# Patient Record
Sex: MALE | Race: WHITE | NOT HISPANIC OR LATINO | Employment: OTHER | URBAN - METROPOLITAN AREA
[De-identification: names, ages, dates, MRNs, and addresses within clinical notes are randomized per-mention and may not be internally consistent; named-entity substitution may affect disease eponyms.]

---

## 2017-01-01 ENCOUNTER — APPOINTMENT (OUTPATIENT)
Dept: CT IMAGING | Facility: HOSPITAL | Age: 82
End: 2017-01-01

## 2017-01-01 ENCOUNTER — INPATIENT HOSPITAL (OUTPATIENT)
Dept: URBAN - METROPOLITAN AREA HOSPITAL 84 | Facility: HOSPITAL | Age: 82
End: 2017-01-01
Payer: COMMERCIAL

## 2017-01-01 ENCOUNTER — HOSPITAL ENCOUNTER (INPATIENT)
Facility: HOSPITAL | Age: 82
LOS: 3 days | Discharge: ANOTHER HOSPITAL | End: 2017-01-09
Attending: INTERNAL MEDICINE | Admitting: INTERNAL MEDICINE

## 2017-01-01 ENCOUNTER — APPOINTMENT (OUTPATIENT)
Dept: GENERAL RADIOLOGY | Facility: HOSPITAL | Age: 82
End: 2017-01-01
Attending: INTERNAL MEDICINE

## 2017-01-01 VITALS
HEART RATE: 90 BPM | DIASTOLIC BLOOD PRESSURE: 86 MMHG | RESPIRATION RATE: 14 BRPM | WEIGHT: 183.86 LBS | TEMPERATURE: 99.4 F | OXYGEN SATURATION: 100 % | HEIGHT: 72 IN | SYSTOLIC BLOOD PRESSURE: 169 MMHG | BODY MASS INDEX: 24.9 KG/M2

## 2017-01-01 DIAGNOSIS — R63.3 FEEDING DIFFICULTIES: ICD-10-CM

## 2017-01-01 LAB
ALBUMIN SERPL-MCNC: 3 G/DL (ref 3.5–5.2)
ALBUMIN SERPL-MCNC: 3.4 G/DL (ref 3.5–5.2)
ALBUMIN/GLOB SERPL: 1.4 G/DL
ALBUMIN/GLOB SERPL: 1.5 G/DL
ALDOLASE SERPL-CCNC: 4.2 U/L (ref 3.3–10.3)
ALP SERPL-CCNC: 53 U/L (ref 39–117)
ALP SERPL-CCNC: 53 U/L (ref 39–117)
ALT SERPL W P-5'-P-CCNC: 17 U/L (ref 1–41)
ALT SERPL W P-5'-P-CCNC: 17 U/L (ref 1–41)
ANION GAP SERPL CALCULATED.3IONS-SCNC: 10.4 MMOL/L
ANION GAP SERPL CALCULATED.3IONS-SCNC: 15.8 MMOL/L
ANION GAP SERPL CALCULATED.3IONS-SCNC: 16.6 MMOL/L
ARTERIAL PATENCY WRIST A: POSITIVE
ARTERIAL PATENCY WRIST A: POSITIVE
AST SERPL-CCNC: 21 U/L (ref 1–40)
AST SERPL-CCNC: 29 U/L (ref 1–40)
ATMOSPHERIC PRESS: 760.3 MMHG
ATMOSPHERIC PRESS: 764.9 MMHG
BACTERIA SPEC AEROBE CULT: NO GROWTH
BACTERIA UR QL AUTO: ABNORMAL /HPF
BASE EXCESS BLDA CALC-SCNC: 2.3 MMOL/L (ref 0–2)
BASE EXCESS BLDA CALC-SCNC: 6 MMOL/L (ref 0–2)
BASOPHILS # BLD AUTO: 0.01 10*3/MM3 (ref 0–0.2)
BASOPHILS NFR BLD AUTO: 0.1 % (ref 0–1.5)
BDY SITE: ABNORMAL
BDY SITE: ABNORMAL
BILIRUB SERPL-MCNC: 0.7 MG/DL (ref 0.1–1.2)
BILIRUB SERPL-MCNC: 0.7 MG/DL (ref 0.1–1.2)
BILIRUB UR QL STRIP: NEGATIVE
BUN BLD-MCNC: 17 MG/DL (ref 8–23)
BUN BLD-MCNC: 18 MG/DL (ref 8–23)
BUN BLD-MCNC: 18 MG/DL (ref 8–23)
BUN/CREAT SERPL: 20.7 (ref 7–25)
BUN/CREAT SERPL: 25.4 (ref 7–25)
BUN/CREAT SERPL: 27.3 (ref 7–25)
CA-I BLD-MCNC: 4.7 MG/DL (ref 4.6–5.4)
CA-I SERPL ISE-MCNC: 1.18 MMOL/L (ref 1.1–1.35)
CALCIUM SPEC-SCNC: 8.1 MG/DL (ref 8.6–10.5)
CALCIUM SPEC-SCNC: 8.3 MG/DL (ref 8.6–10.5)
CALCIUM SPEC-SCNC: 8.5 MG/DL (ref 8.6–10.5)
CHLORIDE SERPL-SCNC: 103 MMOL/L (ref 98–107)
CHLORIDE SERPL-SCNC: 99 MMOL/L (ref 98–107)
CHLORIDE SERPL-SCNC: 99 MMOL/L (ref 98–107)
CK MB SERPL-CCNC: 3.84 NG/ML
CK SERPL-CCNC: 417 U/L (ref 20–200)
CLARITY UR: CLEAR
CO2 SERPL-SCNC: 21.4 MMOL/L (ref 22–29)
CO2 SERPL-SCNC: 23.2 MMOL/L (ref 22–29)
CO2 SERPL-SCNC: 26.6 MMOL/L (ref 22–29)
COLOR UR: YELLOW
CREAT BLD-MCNC: 0.66 MG/DL (ref 0.76–1.27)
CREAT BLD-MCNC: 0.71 MG/DL (ref 0.76–1.27)
CREAT BLD-MCNC: 0.82 MG/DL (ref 0.76–1.27)
DEPRECATED RDW RBC AUTO: 44.6 FL (ref 37–54)
DEPRECATED RDW RBC AUTO: 46.4 FL (ref 37–54)
EOSINOPHIL # BLD AUTO: 0 10*3/MM3 (ref 0–0.7)
EOSINOPHIL NFR BLD AUTO: 0 % (ref 0.3–6.2)
ERYTHROCYTE [DISTWIDTH] IN BLOOD BY AUTOMATED COUNT: 13.4 % (ref 11.5–14.5)
ERYTHROCYTE [DISTWIDTH] IN BLOOD BY AUTOMATED COUNT: 13.7 % (ref 11.5–14.5)
GFR SERPL CREATININE-BSD FRML MDRD: 107 ML/MIN/1.73
GFR SERPL CREATININE-BSD FRML MDRD: 116 ML/MIN/1.73
GFR SERPL CREATININE-BSD FRML MDRD: 90 ML/MIN/1.73
GLOBULIN UR ELPH-MCNC: 2.2 GM/DL
GLOBULIN UR ELPH-MCNC: 2.2 GM/DL
GLUCOSE BLD-MCNC: 108 MG/DL (ref 65–99)
GLUCOSE BLD-MCNC: 75 MG/DL (ref 65–99)
GLUCOSE BLD-MCNC: 93 MG/DL (ref 65–99)
GLUCOSE BLDC GLUCOMTR-MCNC: 116 MG/DL (ref 70–130)
GLUCOSE UR STRIP-MCNC: NEGATIVE MG/DL
HBA1C MFR BLD: 5.89 % (ref 4.8–5.6)
HCO3 BLDA-SCNC: 27.3 MMOL/L (ref 22–28)
HCO3 BLDA-SCNC: 29.1 MMOL/L (ref 22–28)
HCT VFR BLD AUTO: 32.2 % (ref 40.4–52.2)
HCT VFR BLD AUTO: 33 % (ref 40.4–52.2)
HGB BLD-MCNC: 10.7 G/DL (ref 13.7–17.6)
HGB BLD-MCNC: 10.7 G/DL (ref 13.7–17.6)
HGB UR QL STRIP.AUTO: ABNORMAL
HOROWITZ INDEX BLD+IHG-RTO: 40 %
HOROWITZ INDEX BLD+IHG-RTO: 40 %
HYALINE CASTS UR QL AUTO: ABNORMAL /LPF
IMM GRANULOCYTES # BLD: 0 10*3/MM3 (ref 0–0.03)
IMM GRANULOCYTES NFR BLD: 0 % (ref 0–0.5)
KETONES UR QL STRIP: ABNORMAL
LDH SERPL-CCNC: 176 U/L (ref 135–225)
LEUKOCYTE ESTERASE UR QL STRIP.AUTO: ABNORMAL
LYMPHOCYTES # BLD AUTO: 1.46 10*3/MM3 (ref 0.9–4.8)
LYMPHOCYTES NFR BLD AUTO: 20.1 % (ref 19.6–45.3)
MAGNESIUM SERPL-MCNC: 1.8 MG/DL (ref 1.6–2.4)
MCH RBC QN AUTO: 30.1 PG (ref 27–32.7)
MCH RBC QN AUTO: 30.5 PG (ref 27–32.7)
MCHC RBC AUTO-ENTMCNC: 32.4 G/DL (ref 32.6–36.4)
MCHC RBC AUTO-ENTMCNC: 33.2 G/DL (ref 32.6–36.4)
MCV RBC AUTO: 91.7 FL (ref 79.8–96.2)
MCV RBC AUTO: 92.7 FL (ref 79.8–96.2)
MODALITY: ABNORMAL
MODALITY: ABNORMAL
MONOCYTES # BLD AUTO: 0.69 10*3/MM3 (ref 0.2–1.2)
MONOCYTES NFR BLD AUTO: 9.5 % (ref 5–12)
NEUTROPHILS # BLD AUTO: 5.09 10*3/MM3 (ref 1.9–8.1)
NEUTROPHILS NFR BLD AUTO: 70.3 % (ref 42.7–76)
NITRITE UR QL STRIP: NEGATIVE
O2 A-A PPRESDIFF RESPIRATORY: 0.5 MMHG
O2 A-A PPRESDIFF RESPIRATORY: 0.6 MMHG
PCO2 BLDA: 35.1 MM HG (ref 35–45)
PCO2 BLDA: 43.2 MM HG (ref 35–45)
PEEP RESPIRATORY: 5 CM[H2O]
PEEP RESPIRATORY: 5 CM[H2O]
PH BLDA: 7.41 PH UNITS (ref 7.35–7.45)
PH BLDA: 7.53 PH UNITS (ref 7.35–7.45)
PH UR STRIP.AUTO: 6.5 [PH] (ref 5–8)
PHOSPHATE SERPL-MCNC: 1.7 MG/DL (ref 2.5–4.5)
PLATELET # BLD AUTO: 142 10*3/MM3 (ref 140–500)
PLATELET # BLD AUTO: 156 10*3/MM3 (ref 140–500)
PMV BLD AUTO: 10.2 FL (ref 6–12)
PMV BLD AUTO: 10.5 FL (ref 6–12)
PO2 BLDA: 120.4 MM HG (ref 80–100)
PO2 BLDA: 146.8 MM HG (ref 80–100)
POTASSIUM BLD-SCNC: 3.2 MMOL/L (ref 3.5–5.2)
POTASSIUM BLD-SCNC: 3.2 MMOL/L (ref 3.5–5.2)
POTASSIUM BLD-SCNC: 3.4 MMOL/L (ref 3.5–5.2)
PROCALCITONIN SERPL-MCNC: 0.11 NG/ML (ref 0.1–0.25)
PROT SERPL-MCNC: 5.2 G/DL (ref 6–8.5)
PROT SERPL-MCNC: 5.6 G/DL (ref 6–8.5)
PROT UR QL STRIP: ABNORMAL
PSV: 8 CMH2O
RBC # BLD AUTO: 3.51 10*6/MM3 (ref 4.6–6)
RBC # BLD AUTO: 3.56 10*6/MM3 (ref 4.6–6)
RBC # UR: ABNORMAL /HPF
REF LAB TEST METHOD: ABNORMAL
SAO2 % BLDCOA: 99.1 % (ref 92–99)
SAO2 % BLDCOA: 99.3 % (ref 92–99)
SET MECH RESP RATE: 18
SET MECH RESP RATE: 20
SODIUM BLD-SCNC: 136 MMOL/L (ref 136–145)
SODIUM BLD-SCNC: 138 MMOL/L (ref 136–145)
SODIUM BLD-SCNC: 141 MMOL/L (ref 136–145)
SP GR UR STRIP: 1.03 (ref 1–1.03)
SQUAMOUS #/AREA URNS HPF: ABNORMAL /HPF
TOTAL RATE: 20 BREATHS/MINUTE
TSH SERPL DL<=0.05 MIU/L-ACNC: 1.63 MIU/ML (ref 0.27–4.2)
UROBILINOGEN UR QL STRIP: ABNORMAL
VENTILATOR MODE: ABNORMAL
VENTILATOR MODE: AC
VT ON VENT VENT: 430 ML
VT ON VENT VENT: 550 ML
WBC NRBC COR # BLD: 7.25 10*3/MM3 (ref 4.5–10.7)
WBC NRBC COR # BLD: 8.1 10*3/MM3 (ref 4.5–10.7)
WBC UR QL AUTO: ABNORMAL /HPF

## 2017-01-01 PROCEDURE — 94799 UNLISTED PULMONARY SVC/PX: CPT

## 2017-01-01 PROCEDURE — 25010000002 LEVETIRACETAM IN NACL 0.82% 500 MG/100ML SOLUTION: Performed by: PSYCHIATRY & NEUROLOGY

## 2017-01-01 PROCEDURE — 84145 PROCALCITONIN (PCT): CPT | Performed by: INTERNAL MEDICINE

## 2017-01-01 PROCEDURE — 94003 VENT MGMT INPAT SUBQ DAY: CPT

## 2017-01-01 PROCEDURE — 99232 SBSQ HOSP IP/OBS MODERATE 35: CPT | Performed by: PSYCHIATRY & NEUROLOGY

## 2017-01-01 PROCEDURE — 95909 NRV CNDJ TST 5-6 STUDIES: CPT | Performed by: PSYCHIATRY & NEUROLOGY

## 2017-01-01 PROCEDURE — 82803 BLOOD GASES ANY COMBINATION: CPT

## 2017-01-01 PROCEDURE — 70450 CT HEAD/BRAIN W/O DYE: CPT

## 2017-01-01 PROCEDURE — 80053 COMPREHEN METABOLIC PANEL: CPT | Performed by: INTERNAL MEDICINE

## 2017-01-01 PROCEDURE — 82962 GLUCOSE BLOOD TEST: CPT

## 2017-01-01 PROCEDURE — 43246 EGD PLACE GASTROSTOMY TUBE: CPT | Performed by: INTERNAL MEDICINE

## 2017-01-01 PROCEDURE — 82330 ASSAY OF CALCIUM: CPT | Performed by: INTERNAL MEDICINE

## 2017-01-01 PROCEDURE — 82550 ASSAY OF CK (CPK): CPT | Performed by: INTERNAL MEDICINE

## 2017-01-01 PROCEDURE — 25010000003 POTASSIUM CHLORIDE PER 2 MEQ: Performed by: INTERNAL MEDICINE

## 2017-01-01 PROCEDURE — 80048 BASIC METABOLIC PNL TOTAL CA: CPT | Performed by: INTERNAL MEDICINE

## 2017-01-01 PROCEDURE — 95886 MUSC TEST DONE W/N TEST COMP: CPT | Performed by: PSYCHIATRY & NEUROLOGY

## 2017-01-01 PROCEDURE — 84100 ASSAY OF PHOSPHORUS: CPT | Performed by: INTERNAL MEDICINE

## 2017-01-01 PROCEDURE — 87086 URINE CULTURE/COLONY COUNT: CPT | Performed by: INTERNAL MEDICINE

## 2017-01-01 PROCEDURE — 36600 WITHDRAWAL OF ARTERIAL BLOOD: CPT

## 2017-01-01 PROCEDURE — 71010 HC CHEST PA OR AP: CPT

## 2017-01-01 PROCEDURE — 85027 COMPLETE CBC AUTOMATED: CPT | Performed by: INTERNAL MEDICINE

## 2017-01-01 PROCEDURE — 5A1945Z RESPIRATORY VENTILATION, 24-96 CONSECUTIVE HOURS: ICD-10-PCS | Performed by: INTERNAL MEDICINE

## 2017-01-01 PROCEDURE — 94002 VENT MGMT INPAT INIT DAY: CPT

## 2017-01-01 PROCEDURE — 81001 URINALYSIS AUTO W/SCOPE: CPT | Performed by: INTERNAL MEDICINE

## 2017-01-01 PROCEDURE — 83036 HEMOGLOBIN GLYCOSYLATED A1C: CPT | Performed by: INTERNAL MEDICINE

## 2017-01-01 PROCEDURE — 83615 LACTATE (LD) (LDH) ENZYME: CPT | Performed by: INTERNAL MEDICINE

## 2017-01-01 PROCEDURE — 85025 COMPLETE CBC W/AUTO DIFF WBC: CPT | Performed by: INTERNAL MEDICINE

## 2017-01-01 PROCEDURE — 82553 CREATINE MB FRACTION: CPT | Performed by: INTERNAL MEDICINE

## 2017-01-01 PROCEDURE — 84443 ASSAY THYROID STIM HORMONE: CPT | Performed by: INTERNAL MEDICINE

## 2017-01-01 PROCEDURE — 83735 ASSAY OF MAGNESIUM: CPT | Performed by: INTERNAL MEDICINE

## 2017-01-01 PROCEDURE — 82085 ASSAY OF ALDOLASE: CPT | Performed by: INTERNAL MEDICINE

## 2017-01-01 PROCEDURE — 99223 1ST HOSP IP/OBS HIGH 75: CPT | Performed by: PSYCHIATRY & NEUROLOGY

## 2017-01-01 RX ORDER — FAMOTIDINE 10 MG/ML
20 INJECTION, SOLUTION INTRAVENOUS 2 TIMES DAILY
Start: 2017-01-01

## 2017-01-01 RX ORDER — ATENOLOL 25 MG/1
25 TABLET ORAL DAILY
COMMUNITY
End: 2017-01-01 | Stop reason: HOSPADM

## 2017-01-01 RX ORDER — ACETAMINOPHEN 650 MG/1
650 SUPPOSITORY RECTAL EVERY 4 HOURS PRN
Status: DISCONTINUED | OUTPATIENT
Start: 2017-01-01 | End: 2017-01-01

## 2017-01-01 RX ORDER — POTASSIUM CHLORIDE 1.5 G/1.77G
40 POWDER, FOR SOLUTION ORAL AS NEEDED
Status: DISCONTINUED | OUTPATIENT
Start: 2017-01-01 | End: 2017-01-01 | Stop reason: HOSPADM

## 2017-01-01 RX ORDER — CITALOPRAM 20 MG/1
20 TABLET ORAL DAILY
COMMUNITY
End: 2017-01-01 | Stop reason: HOSPADM

## 2017-01-01 RX ORDER — FAMOTIDINE 10 MG/ML
20 INJECTION, SOLUTION INTRAVENOUS 2 TIMES DAILY
Status: DISCONTINUED | OUTPATIENT
Start: 2017-01-01 | End: 2017-01-01 | Stop reason: HOSPADM

## 2017-01-01 RX ORDER — IPRATROPIUM BROMIDE AND ALBUTEROL SULFATE 2.5; .5 MG/3ML; MG/3ML
3 SOLUTION RESPIRATORY (INHALATION) 4 TIMES DAILY PRN
COMMUNITY

## 2017-01-01 RX ORDER — MIDODRINE HYDROCHLORIDE 2.5 MG/1
2.5 TABLET ORAL 3 TIMES DAILY
COMMUNITY
End: 2017-01-01 | Stop reason: HOSPADM

## 2017-01-01 RX ORDER — SODIUM CHLORIDE 0.9 % (FLUSH) 0.9 %
1-10 SYRINGE (ML) INJECTION AS NEEDED
Status: DISCONTINUED | OUTPATIENT
Start: 2017-01-01 | End: 2017-01-01 | Stop reason: HOSPADM

## 2017-01-01 RX ORDER — LEVETIRACETAM 5 MG/ML
500 INJECTION INTRAVASCULAR EVERY 12 HOURS SCHEDULED
Qty: 4000 ML
Start: 2017-01-01

## 2017-01-01 RX ORDER — ATORVASTATIN CALCIUM 20 MG/1
20 TABLET, FILM COATED ORAL DAILY
COMMUNITY
End: 2017-01-01 | Stop reason: HOSPADM

## 2017-01-01 RX ORDER — LEVETIRACETAM 5 MG/ML
500 INJECTION INTRAVASCULAR EVERY 12 HOURS SCHEDULED
Status: DISCONTINUED | OUTPATIENT
Start: 2017-01-01 | End: 2017-01-01 | Stop reason: HOSPADM

## 2017-01-01 RX ORDER — FENTANYL CITRATE 50 UG/ML
50 INJECTION, SOLUTION INTRAMUSCULAR; INTRAVENOUS
Status: DISCONTINUED | OUTPATIENT
Start: 2017-01-01 | End: 2017-01-01 | Stop reason: HOSPADM

## 2017-01-01 RX ORDER — FENTANYL CITRATE 50 UG/ML
25 INJECTION, SOLUTION INTRAMUSCULAR; INTRAVENOUS
Status: DISCONTINUED | OUTPATIENT
Start: 2017-01-01 | End: 2017-01-01 | Stop reason: HOSPADM

## 2017-01-01 RX ORDER — FENTANYL CITRATE 50 UG/ML
50 INJECTION, SOLUTION INTRAMUSCULAR; INTRAVENOUS
Refills: 0
Start: 2017-01-01 | End: 2017-01-16

## 2017-01-01 RX ORDER — DEXMEDETOMIDINE HYDROCHLORIDE 4 UG/ML
.2-1.5 INJECTION, SOLUTION INTRAVENOUS
Status: DISCONTINUED | OUTPATIENT
Start: 2017-01-01 | End: 2017-01-01 | Stop reason: HOSPADM

## 2017-01-01 RX ORDER — ASPIRIN 81 MG/1
81 TABLET, CHEWABLE ORAL DAILY
COMMUNITY
End: 2017-01-01 | Stop reason: HOSPADM

## 2017-01-01 RX ORDER — BUDESONIDE AND FORMOTEROL FUMARATE DIHYDRATE 80; 4.5 UG/1; UG/1
2 AEROSOL RESPIRATORY (INHALATION)
COMMUNITY
End: 2017-01-01 | Stop reason: HOSPADM

## 2017-01-01 RX ORDER — POTASSIUM CHLORIDE 750 MG/1
40 CAPSULE, EXTENDED RELEASE ORAL AS NEEDED
Status: DISCONTINUED | OUTPATIENT
Start: 2017-01-01 | End: 2017-01-01 | Stop reason: HOSPADM

## 2017-01-01 RX ORDER — UBIDECARENONE 100 MG
100 CAPSULE ORAL DAILY
COMMUNITY
End: 2017-01-01 | Stop reason: HOSPADM

## 2017-01-01 RX ORDER — POTASSIUM CHLORIDE 29.8 MG/ML
20 INJECTION INTRAVENOUS
Status: DISCONTINUED | OUTPATIENT
Start: 2017-01-01 | End: 2017-01-01 | Stop reason: HOSPADM

## 2017-01-01 RX ORDER — CHLORAL HYDRATE 500 MG
1200 CAPSULE ORAL
COMMUNITY
End: 2017-01-01 | Stop reason: HOSPADM

## 2017-01-01 RX ORDER — SODIUM CHLORIDE 9 MG/ML
75 INJECTION, SOLUTION INTRAVENOUS CONTINUOUS
Status: DISCONTINUED | OUTPATIENT
Start: 2017-01-01 | End: 2017-01-01 | Stop reason: HOSPADM

## 2017-01-01 RX ADMIN — POTASSIUM CHLORIDE 20 MEQ: 400 INJECTION, SOLUTION INTRAVENOUS at 10:45

## 2017-01-01 RX ADMIN — SODIUM CHLORIDE 75 ML/HR: 9 INJECTION, SOLUTION INTRAVENOUS at 05:41

## 2017-01-01 RX ADMIN — SODIUM CHLORIDE 125 ML/HR: 9 INJECTION, SOLUTION INTRAVENOUS at 05:31

## 2017-01-01 RX ADMIN — SODIUM CHLORIDE 125 ML/HR: 9 INJECTION, SOLUTION INTRAVENOUS at 21:43

## 2017-01-01 RX ADMIN — FAMOTIDINE 20 MG: 10 INJECTION, SOLUTION INTRAVENOUS at 09:05

## 2017-01-01 RX ADMIN — METOPROLOL TARTRATE 5 MG: 5 INJECTION INTRAVENOUS at 10:59

## 2017-01-01 RX ADMIN — POTASSIUM CHLORIDE 20 MEQ: 400 INJECTION, SOLUTION INTRAVENOUS at 09:29

## 2017-01-01 RX ADMIN — FAMOTIDINE 20 MG: 10 INJECTION, SOLUTION INTRAVENOUS at 19:42

## 2017-01-01 RX ADMIN — METOPROLOL TARTRATE 5 MG: 5 INJECTION INTRAVENOUS at 22:00

## 2017-01-01 RX ADMIN — DEXMEDETOMIDINE HYDROCHLORIDE 0.5 MCG/KG/HR: 4 INJECTION, SOLUTION INTRAVENOUS at 14:11

## 2017-01-01 RX ADMIN — FAMOTIDINE 20 MG: 10 INJECTION, SOLUTION INTRAVENOUS at 08:10

## 2017-01-01 RX ADMIN — LEVETIRACETAM 500 MG: 5 INJECTION INTRAVENOUS at 20:43

## 2017-01-01 RX ADMIN — LEVETIRACETAM 500 MG: 5 INJECTION INTRAVENOUS at 13:42

## 2017-01-01 RX ADMIN — METOPROLOL TARTRATE 5 MG: 5 INJECTION INTRAVENOUS at 09:05

## 2017-01-01 RX ADMIN — DEXMEDETOMIDINE HYDROCHLORIDE 0.8 MCG/KG/HR: 4 INJECTION, SOLUTION INTRAVENOUS at 22:00

## 2017-01-01 RX ADMIN — SODIUM CHLORIDE 75 ML/HR: 9 INJECTION, SOLUTION INTRAVENOUS at 13:37

## 2017-01-01 RX ADMIN — LEVETIRACETAM 500 MG: 5 INJECTION INTRAVENOUS at 08:15

## 2017-01-01 RX ADMIN — METOPROLOL TARTRATE 5 MG: 5 INJECTION INTRAVENOUS at 09:31

## 2017-01-01 RX ADMIN — DEXMEDETOMIDINE HYDROCHLORIDE 0.5 MCG/KG/HR: 4 INJECTION, SOLUTION INTRAVENOUS at 22:48

## 2017-01-01 RX ADMIN — LEVETIRACETAM 500 MG: 5 INJECTION INTRAVENOUS at 09:05

## 2017-01-01 RX ADMIN — SODIUM CHLORIDE 75 ML/HR: 9 INJECTION, SOLUTION INTRAVENOUS at 20:43

## 2017-01-01 RX ADMIN — FAMOTIDINE 20 MG: 10 INJECTION, SOLUTION INTRAVENOUS at 08:04

## 2017-01-01 RX ADMIN — DEXMEDETOMIDINE HYDROCHLORIDE 0.8 MCG/KG/HR: 4 INJECTION, SOLUTION INTRAVENOUS at 03:52

## 2017-01-01 RX ADMIN — METOPROLOL TARTRATE 5 MG: 5 INJECTION INTRAVENOUS at 22:19

## 2017-01-01 RX ADMIN — SODIUM CHLORIDE 75 ML/HR: 9 INJECTION, SOLUTION INTRAVENOUS at 15:55

## 2017-01-01 RX ADMIN — FAMOTIDINE 20 MG: 10 INJECTION, SOLUTION INTRAVENOUS at 23:15

## 2017-01-01 RX ADMIN — LEVETIRACETAM 500 MG: 5 INJECTION INTRAVENOUS at 20:26

## 2017-01-01 RX ADMIN — POTASSIUM CHLORIDE 20 MEQ: 400 INJECTION, SOLUTION INTRAVENOUS at 08:44

## 2017-01-01 RX ADMIN — FAMOTIDINE 20 MG: 10 INJECTION, SOLUTION INTRAVENOUS at 18:50

## 2017-01-06 PROBLEM — J96.90 RESPIRATORY FAILURE (HCC): Status: ACTIVE | Noted: 2017-01-01

## 2017-01-06 NOTE — IP AVS SNAPSHOT
AFTER VISIT SUMMARY             Gabriele Szymanski           About your hospitalization     You were admitted on:  January 6, 2017 You last received care in the:  [facility] INTENSIVE CARE       Procedures & Surgeries         Medications    If you or your caregiver advised us that you are currently taking a medication and that medication is marked below as “Resume”, this simply indicates that we have reviewed those medications to make sure our new therapy recommendations do not interfere.  If you have concerns about medications other than those new ones which we are prescribing today, please consult the physician who prescribed them (or your primary physician).  Our review of your home medications is not meant to indicate that we are directing their use.             Your Medications      START taking these medications     famotidine 10 MG/ML solution injection   Infuse 2 mL into a venous catheter 2 (Two) Times a Day.   Last time this was given:  1/9/2017  9:05 AM   Commonly known as:  PEPCID           FentaNYL Citrate (PF) 100 MCG/2ML injection   Infuse 1 mL into a venous catheter Every 30 (Thirty) Minutes As Needed for severe pain (7-10) for up to 7 days.   Commonly known as:  SUBLIMAZE           levETIRAcetam in NaCl 0.82% 500 MG/100ML solution IVPB   Infuse 100 mL into a venous catheter Every 12 (Twelve) Hours.   Last time this was given:  1/9/2017  9:05 AM   Commonly known as:  KEPPRA           metoprolol 1 MG/ML injection   Infuse 5 mL into a venous catheter Every 12 (Twelve) Hours.   Last time this was given:  1/9/2017  9:05 AM   Commonly known as:  LOPRESSOR             CONTINUE taking these medications     ipratropium-albuterol 0.5-2.5 mg/mL nebulizer   Take 3 mL by nebulization 4 (Four) Times a Day As Needed for wheezing.   Commonly known as:  DUO-NEB             STOP taking these medications     aspirin 81 MG chewable tablet           atenolol 25 MG tablet   Commonly known as:  TENORMIN           atorvastatin 20 MG tablet   Commonly known as:  LIPITOR           budesonide-formoterol 80-4.5 MCG/ACT inhaler   Commonly known as:  SYMBICORT           citalopram 20 MG tablet   Commonly known as:  CeleXA           coenzyme Q10 100 MG capsule           fish oil 1000 MG capsule capsule           midodrine 2.5 MG tablet   Commonly known as:  PROAMATINE           MULTIVITAMIN ADULT PO                Where to Get Your Medications      Information about where to get these medications is not yet available     ! Ask your nurse or doctor about these medications     famotidine 10 MG/ML solution injection    FentaNYL Citrate (PF) 100 MCG/2ML injection    levETIRAcetam in NaCl 0.82% 500 MG/100ML solution IVPB    metoprolol 1 MG/ML injection                  Your Medications      Your Medication List           Morning Noon Evening Bedtime As Needed    famotidine 10 MG/ML solution injection   Infuse 2 mL into a venous catheter 2 (Two) Times a Day.   Commonly known as:  PEPCID                                FentaNYL Citrate (PF) 100 MCG/2ML injection   Infuse 1 mL into a venous catheter Every 30 (Thirty) Minutes As Needed for severe pain (7-10) for up to 7 days.   Commonly known as:  SUBLIMAZE                                ipratropium-albuterol 0.5-2.5 mg/mL nebulizer   Take 3 mL by nebulization 4 (Four) Times a Day As Needed for wheezing.   Commonly known as:  DUO-NEB                                levETIRAcetam in NaCl 0.82% 500 MG/100ML solution IVPB   Infuse 100 mL into a venous catheter Every 12 (Twelve) Hours.   Commonly known as:  KEPPRA                                metoprolol 1 MG/ML injection   Infuse 5 mL into a venous catheter Every 12 (Twelve) Hours.   Commonly known as:  LOPRESSOR                                         Instructions for After Discharge         Follow-ups for After Discharge        [facility] allows you to send messages to your doctor, view your test results,  renew your prescriptions, schedule appointments, and more. To sign up, go to [email or web address] and click on the Sign Up Now link in the New User? box. Enter your [employer] Activation Code exactly as it appears below along with the last four digits of your Social Security Number and your Date of Birth () to complete the sign-up process. If you do not sign up before the expiration date, you must request a new code.    [employer] Activation Code: [email or web address]  Expires: 2017 12:45 PM    If you have questions, you can email [email or web address] or call [phone] to talk to our [employer] staff. Remember, [employer] is NOT to be used for urgent needs. For medical emergencies, dial 911.           Summary of Your Hospitalization        Reason for Hospitalization     Your primary diagnosis was:  Not on File    Your diagnoses also included:  Respiratory Failure, Bleeding In Head Following Injury, Nohemi Gehrig Disease      Care Providers     Provider Service Role Specialty    Dariana Taylor MD -- Attending Provider Pulmonary Disease    Nicko Joseph MD -- Consulting Physician  Neurology    Fausto Lira MD -- Surgeon  Neurosurgery       Your Allergies  Date Reviewed: 2017    Allergen Reactions    Contrast Dye Not Noted      Patient Belongings Returned     Document Return of Belongings Flowsheet     Were the patient bedside belongings sent home?   --   Belongings Retrieved from Security & Sent Home   --    Belongings Sent to Safe   --   Medications Retrieved from Pharmacy & Sent Home   --               SYMPTOMS OF A STROKE    Call 911 or have someone take you to the Emergency Department if you have any of the following:    · Sudden numbness or weakness of your face, arm or leg especially on one side of the body  · Sudden confusion, diffiiculty speaking or trouble understanding   · Changes in your vision or loss of sight in one eye  · Sudden severe headache with no known cause  · sudden  dizziness, trouble walking, loss of balance or coordination    It is important to seek emergency care right away if you have further stroke symptoms. If you get emergency help quickly, the powerful clot-dissolving medicines can reduce the disabilities caused by a stroke.     For more information:    American Stroke Association  2-320-7-STROKE  www.strokeassociation.org           IF YOU SMOKE OR USE TOBACCO PLEASE READ THE FOLLOWING:    Why is smoking bad for me?  Smoking increases the risk of heart disease, lung disease, vascular disease, stroke, and cancer.     If you smoke, STOP!    If you would like more information on quitting smoking, please visit the [employer] website: [email or web address]   This link will provide additional resources including the QUIT line and the [employer] the Pack support groups.     For more information:    American Cancer Society  ([phone] 171-4260    American Heart Association  1-338.753.4721               YOU ARE THE MOST IMPORTANT FACTOR IN YOUR RECOVERY.     Follow all instructions carefully.     I have reviewed my discharge instructions with my nurse, including the following information, if applicable:     Information about my illness and diagnosis   Follow up appointments (including lab draws)   Wound Care   Equipment Needs   Medications (new and continuing) along with side effects   Preventative information such as vaccines and smoking cessations   Diet   Pain   I know when to contact my Doctor's office or seek emergency care      I want my nurse to describe the side effects of my medications: YES NO   If the answer is no, I understand the side effects of my medications: YES NO   My nurse described the side effects of my medications in a way that I could understand: YES NO   I have taken my personal belongings and my own medications with me at discharge: YES NO            I have received this information and my questions have been answered.  I have discussed any concerns I see with this plan with the nurse or physician. I understand these instructions.    Signature of Patient or Responsible Person: _____________________________________    Date: _________________  Time: __________________    Signature of Healthcare Provider: _______________________________________  Date: _________________  Time: __________________

## 2017-01-07 NOTE — CONSULTS
Inpatient Consult to Neurosurgery  Consult performed by: KEILA ARIZA  Consult ordered by: NELIDA RICHARDSON          Patient Care Team:  No Known Provider as PCP - General    Chief complaint: Decreased responsiveness    Subjective : This patient apparently fell at home and was found down in [facility].  He was initially admitted to [facility] and then transferred here.  We have paper records but no images from that hospital.  Currently the patient is unresponsive and there is no family present.    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 No pertinent past surgical history., History reviewed. No pertinent family history.,   Social History   Substance Use Topics   • Smoking status: Never Smoker   • Smokeless tobacco: None   • Alcohol use None   ,   Prescriptions Prior to Admission   Medication Sig Dispense Refill Last Dose   • aspirin 81 MG chewable tablet Chew 81 mg Daily.      • atenolol (TENORMIN) 25 MG tablet Take 25 mg by mouth Daily.      • atorvastatin (LIPITOR) 20 MG tablet Take 20 mg by mouth Daily.      • budesonide-formoterol (SYMBICORT) 80-4.5 MCG/ACT inhaler Inhale 2 puffs 2 (Two) Times a Day.      • citalopram (CeleXA) 20 MG tablet Take 20 mg by mouth Daily.      • coenzyme Q10 100 MG capsule Take 100 mg by mouth Daily.      • ipratropium-albuterol (DUO-NEB) 0.5-2.5 mg/mL nebulizer Take 3 mL by nebulization 4 (Four) Times a Day As Needed for wheezing.      • midodrine (PROAMATINE) 2.5 MG tablet Take 2.5 mg by mouth 3  (Three) Times a Day.      • Multiple Vitamins-Minerals (MULTIVITAMIN ADULT PO) Take 1 tablet by mouth Daily.      • Omega-3 Fatty Acids (FISH OIL) 1000 MG capsule capsule Take 1,200 mg by mouth Daily With Breakfast.       and Allergies:  Contrast dye    Objective      Vital Signs  Temp:  [97.6 °F (36.4 °C)-100.4 °F (38 °C)] 98.5 °F (36.9 °C)  Heart Rate:  [69-91] 75  Resp:  [14-20] 14  BP: (138-169)/() 153/94  FiO2 (%):  [40 %-100 %] 40 %    Physical Exam   Constitutional: He appears well-developed and well-nourished.   HENT:   Head: Normocephalic and atraumatic.   Eyes: Conjunctivae are normal. Pupils are equal, round, and reactive to light.   Fundoscopic exam:       The right eye shows no papilledema. The right eye shows venous pulsations.        The left eye shows no papilledema. The left eye shows venous pulsations.   Neck: Carotid bruit is not present.   Neurological: He is unresponsive. No cranial nerve deficit. GCS eye subscore is 1. GCS verbal subscore is 1. GCS motor subscore is 1.   Vitals reviewed.      Results Review:    I reviewed the patient's new clinical results. E scan was done late last night.  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  There is also some blood in the ventricles.        Assessment/Plan : There is no family present to discuss with however I did discuss his situation with the intensive care doctor.  I do not think that the hemorrhage or the subdural hygroma are causing his decreased responsiveness.  I think that he can gradually be weaned off of the ventilator if possible.  There was also a question of ALS but I am not even sure where that arose.  We will recheck another CT tomorrow and attempt to get the CT pictures from [facility].    Active Problems:    Respiratory failure      Assessment & Plan    I discussed the  patients findings and my recommendations with nursing staff    Fausto Lira MD  01/07/17  11:12 AM    Time: na

## 2017-01-07 NOTE — CONSULTS
Patient Identification:  NAME:  Gabriele Szymanski  Age:  81 y.o.   Sex:  male   :  1935   MRN:  [id number]       Chief complaint: Does not have one reason for consult evaluate for possible ALS    History of present illness:  This gentleman is an 81-year-old right-handed white male with history of hyperlipidemia hypertension E hypertension and previous history of polio has a history of subdural hematoma but apparently is being transferred from [place] with what appears to be significant acute blood in his head bilaterally this has presumably been secondary to head trauma but I am unable to find definite history saying this.  He is been seen by Dr. keller we are following him but he has not had any craniotomy.  He appears neurologically stable but had significant respiratory compromise had to be intubated and is been transferred here CT was reviewed with an independent eyeball review.  Also it is been noted he has some fasciculations and it was queried whether he could have ALS.  Duration of these fasciculations unknown quality fasciculations location apparently in all extremities modifying factors none      Past medical history:  Past Medical History   Diagnosis Date   • Asthenia 2016   • Asthma    • Cerebral hemorrhage 2017   • Closed head injury      2017   • COPD (chronic obstructive pulmonary disease)    • Depression 2016   • Disease of thyroid gland    • Hyperlipidemia 2016   • Hypertension    • Multinodular goiter 2016   • Polio    • Subdural hematoma      2017       Past surgical history:  History reviewed. No pertinent past surgical history.    Allergies:  Contrast dye    Home medications:  Prescriptions Prior to Admission   Medication Sig Dispense Refill Last Dose   • aspirin 81 MG chewable tablet Chew 81 mg Daily.      • atenolol (TENORMIN) 25 MG tablet Take 25 mg by mouth Daily.      • atorvastatin (LIPITOR) 20 MG tablet Take 20 mg by mouth Daily.      •  budesonide-formoterol (SYMBICORT) 80-4.5 MCG/ACT inhaler Inhale 2 puffs 2 (Two) Times a Day.      • citalopram (CeleXA) 20 MG tablet Take 20 mg by mouth Daily.      • coenzyme Q10 100 MG capsule Take 100 mg by mouth Daily.      • ipratropium-albuterol (DUO-NEB) 0.5-2.5 mg/mL nebulizer Take 3 mL by nebulization 4 (Four) Times a Day As Needed for wheezing.      • midodrine (PROAMATINE) 2.5 MG tablet Take 2.5 mg by mouth 3 (Three) Times a Day.      • Multiple Vitamins-Minerals (MULTIVITAMIN ADULT PO) Take 1 tablet by mouth Daily.      • Omega-3 Fatty Acids (FISH OIL) 1000 MG capsule capsule Take 1,200 mg by mouth Daily With Breakfast.           Hospital medications:    famotidine 20 mg Intravenous BID   metoprolol 5 mg Intravenous Q12H       dexmedetomidine 0.2-1.5 mcg/kg/hr Last Rate: 0.8 mcg/kg/hr (01/07/17 0352)   sodium chloride 125 mL/hr Last Rate: 125 mL/hr (01/07/17 0531)     FentaNYL Citrate (PF)  •  FentaNYL Citrate (PF)  •  magnesium sulfate in D5W 1g/100mL (PREMIX)  •  potassium chloride **OR** potassium chloride **OR** potassium chloride  •  potassium phosphate infusion greater than 15 mMoles **OR** potassium phosphate infusion greater than 15 mMoles **OR** potassium phosphate infusion 15 mMol or less **OR** sodium phosphate IVPB **OR** sodium phosphate IVPB **OR** sodium phosphate IVPB  •  sodium chloride    Family history:  History reviewed. No pertinent family history.    Social history:  Social History   Substance Use Topics   • Smoking status: Never Smoker   • Smokeless tobacco: None   • Alcohol use None       Review of systems:    Cannot be performed he is intubated    Objective:  Vitals Ranges:   Temp:  [97.6 °F (36.4 °C)-100.4 °F (38 °C)] 98.9 °F (37.2 °C)  Heart Rate:  [66-91] 72  Resp:  [14-20] 14  BP: (137-169)/() 137/82  FiO2 (%):  [40 %-100 %] 40 %      Physical Exam: Intubated the ventilator set on 14 and he is breathing 14 don't see him over breathes the ventilator while I'm here  nonetheless he awakens appears to be awake alert tracks me well cannot speak or follow any of the mental status questions other than he is following my commands pupils to constricting to 1 normal disc retinas visual fields could not be checked extraocular movements are full without nystagmus nasolabial folds are symmetrical he is intubated and cannot look at his tongue or anything else motor 4 out of 5 in all 4 extremities some distal atrophy and he does have fasciculations in all 4 extremities.  Sometimes she have to wait for a while but he does definitely have them.  Chitty bradykinesia reflexes trace throughout symmetrical toes probably mute or downgoing bilaterally sensation coordination station and gait completely impossible for him to follow on the ventilator heart regular without murmur neck is supple without bruits    Results review:   I reviewed the patient's new clinical results.    Data review:  Lab Results (last 24 hours)     Procedure Component Value Units Date/Time    Blood Gas, Arterial [[id number]]  (Abnormal) Collected:  01/06/17 2135    Specimen:  Arterial Blood Updated:  01/06/17 2137     Site Arterial: right radial      Cristobal's Test Positive      pH, Arterial 7.526 (H) pH units      pCO2, Arterial 35.1 mm Hg      pO2, Arterial 120.4 (H) mm Hg      HCO3, Arterial 29.1 (H) mmol/L      Base Excess, Arterial 6.0 (H) mmol/L      O2 Saturation Calculated 99.1 (H) %      A-a Gradiant 0.5 mmHg      Barometric Pressure for Blood Gas 760.3 mmHg      Modality Adult Vent      FIO2 40 %      Ventilator Mode AC      Set Tidal Volume 550      Set Mech Resp Rate 20      Rate 20 Breaths/minute      PEEP 5     Narrative:       SATS 100% Meter: [id number] : [id number] Rosa Berrios    Aldolase [[id number]] Collected:  01/06/17 2150    Specimen:  Blood Updated:  01/06/17 2201    CBC Auto Differential [[id number]]  (Abnormal) Collected:  01/06/17 2150    Specimen:  Blood Updated:  01/06/17 2211     WBC 7.25 10*3/mm3       RBC 3.56 (L) 10*6/mm3      Hemoglobin 10.7 (L) g/dL      Hematocrit 33.0 (L) %      MCV 92.7 fL      MCH 30.1 pg      MCHC 32.4 (L) g/dL      RDW 13.7 %      RDW-SD 46.4 fl      MPV 10.2 fL      Platelets 156 10*3/mm3      Neutrophil % 70.3 %      Lymphocyte % 20.1 %      Monocyte % 9.5 %      Eosinophil % 0.0 (L) %      Basophil % 0.1 %      Immature Grans % 0.0 %      Neutrophils, Absolute 5.09 10*3/mm3      Lymphocytes, Absolute 1.46 10*3/mm3      Monocytes, Absolute 0.69 10*3/mm3      Eosinophils, Absolute 0.00 10*3/mm3      Basophils, Absolute 0.01 10*3/mm3      Immature Grans, Absolute 0.00 10*3/mm3     Calcium, Ionized [[id number]]  (Normal) Collected:  01/06/17 2150    Specimen:  Blood Updated:  01/06/17 2233     Ionized Calcium 1.18 mmol/L      Ionized Calcium 4.7 mg/dL     Phosphorus [[id number]]  (Abnormal) Collected:  01/06/17 2150    Specimen:  Blood Updated:  01/06/17 2238     Phosphorus 1.7 (L) mg/dL     Magnesium [[id number]]  (Normal) Collected:  01/06/17 2150    Specimen:  Blood Updated:  01/06/17 2238     Magnesium 1.8 mg/dL     CK [[id number]]  (Abnormal) Collected:  01/06/17 2150    Specimen:  Blood Updated:  01/06/17 2238     Creatine Kinase 417 (H) U/L     Comprehensive Metabolic Panel [[id number]]  (Abnormal) Collected:  01/06/17 2150    Specimen:  Blood Updated:  01/06/17 2238     Glucose 108 (H) mg/dL      BUN 17 mg/dL      Creatinine 0.82 mg/dL      Sodium 136 mmol/L      Potassium 3.4 (L) mmol/L      Chloride 99 mmol/L      CO2 26.6 mmol/L      Calcium 8.5 (L) mg/dL      Total Protein 5.6 (L) g/dL      Albumin 3.40 (L) g/dL      ALT (SGPT) 17 U/L      AST (SGOT) 29 U/L      Alkaline Phosphatase 53 U/L      Total Bilirubin 0.7 mg/dL      eGFR Non African Amer 90 mL/min/1.73      Globulin 2.2 gm/dL      A/G Ratio 1.5 g/dL      BUN/Creatinine Ratio 20.7      Anion Gap 10.4 mmol/L     Narrative:       The MDRD GFR formula is only valid for adults with stable renal function between ages 18 and 70.  "   Lactate Dehydrogenase [[id number]]  (Normal) Collected:  01/06/17 2150    Specimen:  Blood Updated:  01/06/17 2238      U/L     CK-MB [[id number]]  (Normal) Collected:  01/06/17 2150    Specimen:  Blood Updated:  01/06/17 2245     CKMB 3.84 ng/mL     Procalcitonin [[id number]]  (Normal) Collected:  01/06/17 2150    Specimen:  Blood Updated:  01/06/17 2245     Procalcitonin 0.11 ng/mL     Narrative:       As a Marker for Sepsis (Non-Neonates):   1. <0.5 ng/mL represents a low risk of severe sepsis and/or septic shock.  1. >2 ng/mL represents a high risk of severe sepsis and/or septic shock.    As a Marker for Lower Respiratory Tract Infections that require antibiotic therapy:  PCT on Admission     Antibiotic Therapy             6-12 Hrs later  > 0.5                Strongly Recommended            >0.25 - <0.5         Recommended  0.1 - 0.25           Discouraged                   Remeasure/reassess PCT  <0.1                 Strongly Discouraged          Remeasure/reassess PCT      As 28 day mortality risk marker: \"Change in Procalcitonin Result\" (> 80 % or <=80 %) if Day 0 (or Day 1) and Day 4 values are available. Refer to http://www.[email or web address]-pct-calculator.[email or web address]/   Change in PCT <=80 %   A decrease of PCT levels below or equal to 80 % defines a positive change in PCT test result representing a higher risk for 28-day all-cause mortality of patients diagnosed with severe sepsis or septic shock.  Change in PCT > 80 %   A decrease of PCT levels of more than 80 % defines a negative change in PCT result representing a lower risk for 28-day all-cause mortality of patients diagnosed with severe sepsis or septic shock.                Urine Culture [[id number]] Collected:  01/06/17 2225    Specimen:  Urine from Urine, Catheter Updated:  01/07/17 0007    Urinalysis With / Culture If Indicated [[id number]]  (Abnormal) Collected:  01/06/17 2225    Specimen:  Urine from Urine, Catheter Updated:  01/07/17 0010     Color, UA Yellow     "  Appearance, UA Clear      pH, UA 6.5      Specific Gravity, UA 1.026      Glucose, UA Negative      Ketones, UA 40 mg/dL (2+) (A)      Bilirubin, UA Negative      Blood, UA Moderate (2+) (A)      Protein, UA 30 mg/dL (1+) (A)      Leuk Esterase, UA Moderate (2+) (A)      Nitrite, UA Negative      Urobilinogen, UA 0.2 E.U./dL     Urinalysis, Microscopic Only [[id number]]  (Abnormal) Collected:  01/06/17 2225    Specimen:  Urine from Urine, Catheter Updated:  01/07/17 0027     RBC, UA 6-12 (A) /HPF      WBC, UA 13-20 (A) /HPF      Bacteria, UA None Seen /HPF      Squamous Epithelial Cells, UA 0-2 /HPF      Hyaline Casts, UA 0-2 /LPF      Methodology Manual Light Microscopy            Imaging:  Imaging Results (last 24 hours)     Procedure Component Value Units Date/Time    XR Chest 1 View [[id number]] Collected:  01/06/17 2117     Updated:  01/06/17 2121    Narrative:       PORTABLE CHEST X-RAY     HISTORY: Endotracheal tube placement.     A portable frontal image of the chest is provided. There is no prior  exam for comparison.     FINDINGS: There is an endotracheal tube with its tip 3 cm above the  kathryn. A right PICC line has its tip at the vena cava atrial junction.  The cardiomediastinal silhouette is normal. The lungs are clear and the  costophrenic sulci are dry.       Impression:       Hardware as described. No cardiopulmonary abnormality is  evident on x-ray.     This report was finalized on 1/6/2017 9:17 PM by Dr. Dameon Ross MD.       CT Head Without Contrast [[id number]] Collected:  01/06/17 2338     Updated:  01/06/17 2338    Narrative:       CT SCAN OF THE BRAIN WITHOUT CONTRAST.     TECHNIQUE: Multiple axial images of the brain were obtained from the  vertex to the base of the brain.     HISTORY:  Intracranial hemorrhage.     COMPARISON:  No prior studies for comparison.     FINDINGS: Examination is limited due to motion.     There is a moderately large hygroma layering the right cerebral  hemisphere  measuring up to 1.2 cm in thickness. There are small in the  axial hemorrhage is involving the right temporal lobe, these may be a  consequence of contusion type injury, largest of these is seen in the  anterior pole of the right temporal lobe and measures 2.6 x 2.2 cm,  there is mild surrounding edema, there is some mass effect. Other foci  of intracranial hemorrhage are seen in the right periventricular white  matter measuring 0.7 x 0.5 cm, another focus of acute intra-axial  hemorrhage is noted adjacent to the falx to the left of midline and  measures 0.6 x 0.3 cm.     Small subdural hemorrhage layers the posterior falx to the right of  midline and measures 0.7 cm in thickness, there may be some subdural  hemorrhage layering the tentorium cerebelli especially on the right.  Small amount of intraventricular hemorrhage is noted on the left, there  may be minimal hemorrhage in the dependent portion of the right lateral  ventricle. Minimal subarachnoid blood layering the left posterior  temporal lobe cannot be excluded.      Midline structures are within normal limits, there is no hydrocephalus.   Gray-white matter differentiation is maintained.          Mild mucosal disease of the paranasal sinuses. Orbits are unremarkable.   Mastoid air cells are well aerated.          Impression:       1.  Multiple intra-axial hemorrhages mostly in the right temporal lobe  largest measuring 2.6 cm.  2.  Subcentimeter hemorrhage 1 each in the periventricular white matter  is seen bilaterally.  3.  Intraventricular acute hemorrhage is present.  4.  Subdural hemorrhage layering the posterior falx and the tentorium.  5.  Small amount of subarachnoid hemorrhage is suspected layering the  temporal lobes bilaterally.          Case was discussed with patient's nurse Елена, 11:37 PM.                Assessment and Plan:     This gentleman definitely has had some degree of metabolic encephalopathy associated with his multiple areas of brain  hemorrhage almost certainly consistent with head trauma.  He still getting some sedation for comfort but we may be able to extubate him today.  It is hard to tell but he does not appear to have significant lateralized sedation based upon his head injury.  Based upon my exam, however, he does appear to have fasciculations in all 4 extremities but I'm not able to do a proper inspection of his tongue as he is still intubated.  This of course would be very worrisome for motor neuron disease/ALS.  It is still too early to call that but given the appearance of the fasciculations the diagnosis of ALS would be worrisome  Lastly if he is extubated and I can get a good look at his tongue at rest, that will likely tell me whether or not he has true ALS as I would expect to see fasciculations in the tongue.      Dameon Ogden MD  01/07/17  12:10 PM

## 2017-01-07 NOTE — PLAN OF CARE
Problem: Patient Care Overview (Adult)  Goal: Plan of Care Review  Outcome: Ongoing (interventions implemented as appropriate)    01/07/17 1535   Coping/Psychosocial Response Interventions   Plan Of Care Reviewed With patient;family   Patient Care Overview   Progress no change   Outcome Evaluation   Outcome Summary/Follow up Plan Patient admitted from [facility] for EMG, patient thought to have ALS or post polio syndrome. Attempted to wean from vent and unable due to Very poor NIF reading, possibly due to diapragm. Dr Billy, Neurology here at bedside to perform testing. B/P slightly high and Metoprolol ordered. Sedated on precedex, will await prognosis.          Problem: Fall Risk (Adult)  Goal: Identify Related Risk Factors and Signs and Symptoms  Outcome: Ongoing (interventions implemented as appropriate)    01/07/17 1535   Fall Risk   Fall Risk: Related Risk Factors history of falls;environment unfamiliar;sensory deficits   Fall Risk: Signs and Symptoms presence of risk factors       Goal: Absence of Falls  Outcome: Ongoing (interventions implemented as appropriate)    01/07/17 1535   Fall Risk (Adult)   Absence of Falls making progress toward outcome         Problem: SAFETY - NON-VIOLENT RESTRAINT  Goal: Remains free of injury from restraints (Non-Violent Restraint)  Outcome: Ongoing (interventions implemented as appropriate)  Goal: Free from restraint(s) (Non-Violent Restraint)  Outcome: Ongoing (interventions implemented as appropriate)    Problem: Mechanical Ventilation, Invasive (Adult)  Goal: Signs and Symptoms of Listed Potential Problems Will be Absent or Manageable (Mechanical Ventilation, Invasive)  Outcome: Ongoing (interventions implemented as appropriate)    01/07/17 1535   Mechanical Ventilation, Invasive   Problems Assessed (Mechanical Ventilation, Invasive) all   Problems Present (Mechanical Ventilation, Invasive) artificial airway induced skin/tissue breakdown;immobility;situational response          Problem: Respiratory Insufficiency (Adult)  Goal: Identify Related Risk Factors and Signs and Symptoms  Outcome: Ongoing (interventions implemented as appropriate)    01/07/17 1535   Respiratory Insufficiency   Related Risk Factors (Respiratory Insufficiency) activity intolerance;artificial airway;bedrest   Signs and Symptoms (Respiratory Insufficiency) abnormal breath sounds;respiratory rate alterations;shortness of breath       Goal: Acid/Base Balance  Outcome: Outcome(s) achieved Date Met:  01/07/17  Goal: Effective Ventilation  Outcome: Ongoing (interventions implemented as appropriate)    01/07/17 1535   Respiratory Insufficiency (Adult)   Effective Ventilation making progress toward outcome         Problem: Stroke (Hemorrhagic) (Adult)  Goal: Signs and Symptoms of Listed Potential Problems Will be Absent or Manageable (Stroke)  Outcome: Ongoing (interventions implemented as appropriate)    01/07/17 1535   Stroke (Hemorrhagic)   Problems Assessed (Stroke (Hemorrhagic)) all   Problems Present (Stroke (Hemorrhagic)) motor/sensory impairment;respiratory compromise;situational response

## 2017-01-07 NOTE — PROGRESS NOTES
[facility].  Daily Progress Note.     Gabriele Szymanski  81 y.o.  male  1/7/2017    Brief problem (summary)  81-year-old male was transferred from [facility].  Unfortunately as detailed by Dr. Lewis in his initial encounter that was not proper transfer without medical records.  The story is that she has hospital diagnosis of ALS and they have no neurologist wanted to transfer here for further evaluation.  In addition he had a fall couple of days ago and has intracranial hemorrhage.  Patient was transferred on the ventilator.  Neurology and neurosurgery has been consulted.  Now the medical records are available.  Apparently patient had a syncopal episode and had a fall at home and lost consciousness.  Patient's wife found him on the floor of the bathroom and EMS was called was admitted to the hospital on 2 January 2017.      Today, patient is on ventilator sedated on Precedex.  He has a normal saline.    PMS/FH/SH: reviewed and unchanged.  Medications:     famotidine 20 mg Intravenous BID   metoprolol 5 mg Intravenous Q12H       dexmedetomidine 0.2-1.5 mcg/kg/hr Last Rate: 0.8 mcg/kg/hr (01/07/17 0352)   sodium chloride 125 mL/hr Last Rate: 125 mL/hr (01/07/17 0531)       ROS:  Respiratory ROS: NA    Objective:  Temp:  [97.6 °F (36.4 °C)-100.4 °F (38 °C)] 98.5 °F (36.9 °C)  I/O last 3 completed shifts:  In: 1246 [I.V.:1246]  Out: 400 [Urine:400]       Physical Exam   Constitutional: Vital signs are normal. He appears well-developed. He is easily aroused. He is sedated and intubated.   HENT:   Head: Normocephalic and atraumatic. Head is without abrasion and without laceration.   Nose: No rhinorrhea or nasal deformity. No epistaxis.   Eyes: Pupils are equal, round, and reactive to light.   Neck: No JVD present. No rigidity. No tracheal deviation and no edema present. No thyroid mass and no thyromegaly present.   Cardiovascular: Regular rhythm and normal heart sounds.    Pulmonary/Chest: He is  intubated. He has no decreased breath sounds. He has no wheezes.   Abdominal: Soft. Normal appearance and bowel sounds are normal. He exhibits no ascites. There is no hepatosplenomegaly.   Neurological: He is easily aroused.   Skin: No laceration, no petechiae and no rash noted. No cyanosis. Nails show no clubbing.        CXR  ET ok  No acute changes    CT:  1. Multiple intra-axial hemorrhages mostly in the right temporal lobe  largest measuring 2.6 cm.  2. Subcentimeter hemorrhage 1 each in the periventricular white matter  is seen bilaterally.  3. Intraventricular acute hemorrhage is present.  4. Subdural hemorrhage layering the posterior falx and the tentorium.  5. Small amount of subarachnoid hemorrhage is suspected layering the  temporal lobes bilaterally.     Results from last 7 days  Lab Units 01/06/17  2150   WBC 10*3/mm3 7.25   HEMOGLOBIN g/dL 10.7*   HEMATOCRIT % 33.0*   PLATELETS 10*3/mm3 156       Results from last 7 days  Lab Units 01/06/17  2150   SODIUM mmol/L 136   POTASSIUM mmol/L 3.4*   CHLORIDE mmol/L 99   TOTAL CO2 mmol/L 26.6   BUN mg/dL 17   CREATININE mg/dL 0.82   GLUCOSE mg/dL 108*   CALCIUM mg/dL 8.5*           Results from last 7 days  Lab Units 01/06/17  2135   PH, ARTERIAL pH units 7.526*   PO2 ART mm Hg 120.4*   PCO2, ARTERIAL mm Hg 35.1   HCO3 ART mmol/L 29.1*   This ABG done on rate of 20 and now he is on rate of 14     ASSESSMENT/ PLAN:  · Acute hypoxic respiratory failure.  His chest x-ray is clear and the ABG is reasonable and some dose starting on weaning protocol and hopefully can extubate him.  · History of fall with subdural hematoma.  Neurosurgery has seen the patient and will follow up with another repeat CT in the morning.  · History of syncope  · According to the [facility] course this suspected that the patient may have ALS.  Neurology is going to be evaluating the patient.  ·  DVT prophylaxis, SCDs anticoagulation is contraindicated  · Stress ulcer prophylaxis  continue Pepcid  · History of hypertension     Critical care time 40 minutes    Dariana Taylor MD,[facility]  Pulmonary, Critical Care and Sleep Medicine.  [facility]    1/7/2017    EMR Dragon/Transcription disclaimer:   Much of this encounter note is an electronic transcription/translation of spoken language to printed text. The electronic translation of spoken language may permit erroneous, or at times, nonsensical words or phrases to be inadvertently transcribed; Although I have reviewed the note for such errors, some may still exist.

## 2017-01-07 NOTE — PROCEDURES
EMG and Nerve Conduction Studies    Please see data sheets for details on methods, temperatures and lab standards. EMG muscles tested for upper extremity studies include the deltoid, biceps, triceps, pronator teres, extensor digitorum communis, first dorsal interosseous and abductor pollicis brevis.  EMG muscles tested for lower extremity studies include the vastus lateralis, tibialis anterior, peroneus longus, medial gastrocnemius and extensor digitorum brevis.  Additional muscles tested as needed.  Paraspinal muscles tested as needed.  The complete report includes the data sheets.    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  The patient was transferred here from [facility] under the care of Dr. Nolan and Dr. Bryant regarding an EMG for a more definitive look at his peripheral nervous system deficits.  In addition to this he has had a recent head injury with right temporal cerebral contusion, subarachnoid hemorrhage and chronic subdural      Ht: 71.5 inches  Wt: 178 pounds  HbA1C: No results found for: HGBA1C  TSH: No results found for: TSH    Technical summary:  Nerve conduction studies were obtained in the left arm and left leg.  The study was done in the intensive care unit and there were excessive electrical artifacts which made the sensory nerve conductions unreadable.  Skin temperatures were in the 35°C range or more measured on the palms, elbow and at the left ankle.  Needle examination was obtained on selected muscles in the left arm and left leg.  Significant electrical artifacts were seen which nearly obscures baseline insertional activities.  There were no voluntary motor units as the patient was sedated.    Results:  1.  Unreadable left median  and ulnar sensory studies due to electrical artifacts in the intensive care unit.  2.  Normal left median motor distal latency and conduction velocity in the forearm and above the elbow with low amplitudes of 3.3 mV from the wrist without significant evidence for motor conduction block proximally.  Stimulating the palm produced an amplitude of 3.8 mV which is not a significant difference from the wrist to suggest conduction block at the wrist.  Normal F wave.  3.  Normal left ulnar motor distal latency with normal conduction velocities below the elbow, across the elbow and above the elbow.  The amplitude was low at 4.0 mV from the wrist without evidence of conduction block proximally.  Normal F wave.  4.  Slow left peroneal motor velocity below the knee at 34 m/s with normal velocity in the short segment across the fibular head.  Normal distal latency.  Low amplitude of 0.9 mV from the ankle.  No evidence of conduction block on proximal stimulation.  The F-wave was not interpretable due to excessive electrical artifacts.  5.  Normal left tibial motor velocity and distal latency.  The amplitude was normal at 2.8 mV from ankle.  It was borderline to slightly low from the popliteal fossa at 1.8 mV but baseline was not good again due to electrode artifact.  Mildly prolonged F wave at 59.5.  6.  Needle examination of selected muscles in the left arm and left leg showed significant baseline electrical artifacts.  There were however repetitive discharges consistent with fibrillations and positive sharp waves in all muscles tested in the arm and about half the muscles tested in the leg.  There were no voluntary motor units as the patient was sedated.    Impression:  Abnormal study.  The study is technically suboptimal due to the nature of electrical artifacts in the intensive care unit.  The study however shows predominantly low motor amplitudes with evidence of acute denervation in most muscles tested.  This is consistent  with motor neuron disease.  The study is not typical of a demyelinative polyneuropathy in that there is no widespread severe conduction slowing or conduction block.  Clinical correlation is suggested.    Griffin Billy M.D.

## 2017-01-07 NOTE — PLAN OF CARE
Problem: Stroke (Hemorrhagic) (Adult)  Goal: Signs and Symptoms of Listed Potential Problems Will be Absent or Manageable (Stroke)  Outcome: Ongoing (interventions implemented as appropriate)  Pt transferred from [facility].  Dr. Lewis at bedside.  CXR obtained and ETT pulled out 2 cm followed by repeat CXR.  Dr. Lewis confirmed good placement.  Precedex started and Propofol DC'd.  Pt taken for CT scan.  No problems encountered overnight.    01/07/17 0700   Stroke (Hemorrhagic)   Problems Assessed (Stroke (Hemorrhagic)) all   Problems Present (Stroke (Hemorrhagic)) respiratory compromise

## 2017-01-07 NOTE — H&P
Patient Care Team:  No Known Provider as PCP - General    Chief complaint apparently patient was transferred for evaluation of possible ALS    Subjective     Patient is a 81 y.o. male transferred from [facility] I spoke with Dr. Jeffrey who accepted him a couple of days ago but we were on bed diversion apparently a bed opened up and he was sent today we were not notified his impression was that he was coming.  And unfortunately there are no physician notes accompanying the patient.  All we have her labs and radiology reports and a CD of the radiographs.  I did speak with Dr. Jeffrey there was apparently concern regarding ALS and they were apparently unable to evaluate and neurology here was apparently talked to and agreed to evaluate unfortunately we don't know which neurologist was consulted.. From reviewing the records and what the nurses were unable to discuss with the family patient apparently fell the first or second and it looks like he sustained an intracranial hemorrhage.  We have called twice to [place] trying to get records sent over detailing history, was done who was still talked with and we have been unable thus far to get those records sent been trying for over an hour now initially we were told they were too busy  Review of Systems  Unable to obtain patient intubated and sedated    History  No past medical history on file.  No past surgical history on file.  No family history on file.  Social History     Social History   • Marital status:      Spouse name: N/A   • Number of children: N/A   • Years of education: N/A     Social History Main Topics   • Smoking status: Not on file   • Smokeless tobacco: Not on file   • Alcohol use Not on file   • Drug use: Not on file   • Sexual activity: Not on file     Other Topics Concern   • Not on file     Social History Narrative       Allergies:  Review of patient's allergies indicates not on file.    Objective     Vital Signs  Temp:  [100.4  "°F (38 °C)] 100.4 °F (38 °C)  Heart Rate:  [91] 91  Resp:  [20] 20  BP: (138)/(79) 138/79  FiO2 (%):  [40 %-100 %] 40 %  Body mass index is 24.53 kg/(m^2).  No intake or output data in the 24 hours ending 01/06/17 2117       Flowsheet Rows         First Filed Value    Admission Height  71.5\" (181.6 cm) Documented at 01/06/2017 2100    Admission Weight  178 lb 5.6 oz (80.9 kg) Documented at 01/06/2017 2100           Physical Exam:  General Appearance: Elderly white male he is orally intubated with an 8.0 endotracheal tube at about 25 cm at the lips.  He is sedated with propofol at 30 mics he is on D5 NS.  He is on the ventilator assist control of 20 tidal volume of 550 PEEP of 5 FiO2 50% his SPO2 is 100%  Eyes: I don't see any definite head trauma his pupils are equal they're about 3 mm and they do react to light conjunctivae are clear and anicteric  ENT: Oral mucous membranes are dry nasal septum midline  Neck: Trachea is midline there's no palpable adenopathy or thyromegaly no jugular venous distention is evident I don't feel any cervical step offs  Lungs: Clear breath sounds are little decreased on the left versus the right I don't hear any wheezes or rales no rhonchi  Cardiac: Regular rate and rhythm I don't appreciate a murmur or gallop  Abdomen: Soft no palpable organomegaly or masses  : Wallace catheter with clear yellow urine normal external male genitalia  Musc/Skel: He does have fasciculations in both eyes very coarse not fine don't see any other fasciculations  Skin: No jaundice, no rashes, no petechiae  Neuro: Patient is sedated with propofol he does move some extremities spontaneously he is not following commands.  He doesn't have much in the way of plantar reflexes, he doesn't have patellar reflexes but he has brisk brachial reflexes bilaterally  Extremities/P Vascular: No clubbing cyanosis or edema he has palpable radial dorsalis pedis pulses he has a right upper extremity PICC type line in place  MSE: " Unable to assess      Labs:  WBC No results found for: WBCS   HGB No results found for: HGB   HCT No results found for: HCT   Platlets No results found for: LABPLAT     PT/INR:    No results found for: PROTIME/No results found for: INR    Sodium No results found for: NA   Potassium No results found for: K   Chloride No results found for: CL   Bicarbonate No results found for: PLASMABICARB   BUN No results found for: BUN   Creatinine No results found for: CREATININE   Calcium No results found for: CALCIUM   Magnesium No results found for: MG     pH No results found for: PHART   pO2 No results found for: PO2ART   pCO2 No results found for: [id number]   HCO3 No results found for: [id number]       Radiographic Imaging:  Imaging Results (last 24 hours)     Procedure Component Value Units Date/Time    XR Chest 1 View [[id number]] Resulted:  01/06/17 2117     Updated:  01/06/17 2111          Labs that accompanied the patient from [place] unfortunately I believe the date is 1/5/17 on these sodium 133 potassium 3.8 chloride 96 bicarbonate 31 glucose was 117 BUN of 18 creatinine 0.8 calcium 8.3 total protein 5.1 with an albumin of 2.5 total bili 0.8 alkaline phosphatase of 58 AST of 31 AST is 26 PT of 25.3 troponin was 0.03 urinalysis 30 mg/dL protein pH was 6 specific gravity 1.02 to moderate blood ketone negative nitrate and leukocyte esterase -36 red blood cells 3 white blood cells 2 epithelial cells calcium total was 9.1 vancomycin trough was 10 aspirin platelet response 444 which was in their normal range urine culture was greater than 10 to the 6 coag-negative staph white count was 9700 hemoglobin 13.9 hematocrit 41.5 by 219,000 hemoglobin A1c was 6 CK-MB was elevated at 12.1, B12 was normal at 791 PT/INR 0.8 TSH normal 1.73 cholesterol 121 triglycerides 56 CT head from 1/3/17 impression interval increase intraparenchymal hemorrhage in the right temporal lobe with increased adjacent subarachnoid hemorrhage 2 stable appearance of  right posterior parafalcine subdural hematoma with some additional subdural hematoma along the right tentorium which appears more conspicuous on the prior study 3 newly visualized 4 mm intraparenchymal or subarachnoid hemorrhage in the left hemisphere along the midline for stable focus of hemorrhage in the right corona radiata 5 no midline shift or herniation seen at this time there is local mass effect on the right temporal lobes.  CT abdomen pelvis impression 1 no acute disease and abnormal pelvis to prostate is enlarged 3 transverse colon was mildly distended with gas and hepatic flexure of the colon can be seen line between the dome of the right lobe of the liver in the right hemidiaphragm CT of the chest also 1/3/17 acute disease in the chest the brain from 1/3/17 acute intraparenchymal hemorrhage in the anterior right temporal lobe with hemorrhage extending superiorly to the level of the right sylvian fissure as seen on CT to thin subdural hematoma along the right temporal parietal convex and fatigue of the posterior falx cerebri 3 additional areas of hemorrhage are better evaluate on CT for motion limited exam I have an EKG that shows sinus tachycardia with some inferior Q suggesting a possible old inferior infarct  Arterial blood gas pH 7.33 PCO2 61 PO2 166 that was apparently on assist control of 20 tidal volume 500 PEEP of 0 and an FiO2 of 50% think that was on 1/5/17 at that time lactic acid was also normal at 0.60    Assessment/Plan     Patient Active Problem List   Diagnosis Code   • Respiratory failure J96.90         We did a chest x-ray here there is a little bit of atelectasis in the left base endotracheal tube looks like its tip is right at the kathryn we pulled the tube back 2 cm at about 23 cm the lips repeatedly x-ray endotracheal tube looks in better position the tip of the PICC line does appear to terminate in the superior vena cava     Impression #1 intracranial hemorrhage nursing didn't get  that it was all related we will get a repeat CT scan of the head here and ask radiologist compared to the scans that were sent over with the patient.  I will consult neurosurgery to assist with management of this.  #2 apparently question of ALS the patient does have some coarse fasciculations in his thigh muscles bilaterally.  Since I don't have much in way of history or workup I am fairly limited until we can get those records and studies I will go ahead and consult neurology.  I'm going to switch the patient's sedation around 2 dexmedetomidine see if we can get him awake so that we can better assess his neurologic status.  #3 respiratory failure I am assuming this was a neurologic failure.  I am getting ABGs here again we repositioned the endotracheal tube  #4 mild hyponatremia will check labs here  #5 hypoproteinemia/hypoalbuminemia with his nutritional status has been.  #6 fluids/I/nutrition I am going to give him some normal saline maintenance fluids while we evaluate he may need institution of enteral nutrition in near future  #7 DVT prophylaxis with his intracranial bleed obviously I will not anticoagulate Will use sequential compression devices.  #8 gut prophylaxis I'll give him an H2 blocker for now  #9 patient had some coag-negative staph and a urine but he really didn't have many white cells his nitrates and leukocyte esterase were negative I'm wondering if that was a contaminant.  Apparently he was on some vancomycin because I saw vancomycin trough I'm not sure why again since we can get some records we may be able to determine more.  For completeness addition to getting routine labs I will check a pro-calcitonin    Anxiously awaiting records from Trevon Lewis MD  01/06/17  9:17 PM    Time: I have spent over an hour and evaluation of this patient thus far

## 2017-01-07 NOTE — PROGRESS NOTES
Neurology progress note    My apology to all involved in this case.  I was called by Dr. Bryant 2 days ago I spoke with him around 1 PM.  He requested transfer for an inpatient EMG to assess for motor neuron disease.  I checked for the patient later on Thursday as well as on Friday twice and he had not been transferred.  I assumed his condition prevented transfer.  My  ask the answering service to forward any messages to me regarding this patient being admitted however I did not hear about the patient being here until this afternoon from the nurse in the intensive care unit.    I completed a technically suboptimal EMG on this patient in the intensive care unit.  Electrical artifacts are very prominent and basically impossible to get around in the unit.  The sensory studies were unreadable.  The motor studies showed predominantly low motor amplitudes with normal velocities including F waves although there were some minor exceptions.  Needle examination was also technically suboptimal due to excessive artifacts but I am convinced that one could see fibrillations and positive sharp waves in most of the muscles tested consistent with acute denervation.  The study is most consistent with motor neuron disease as opposed to a primarily demyelinative neuropathy such as Guillain-Barré syndrome or chronic inflammatory demyelinative polyneuropathy.    The family asks if he can be transferred back at some point to [facility].  Dr. Bryant who is the neurologist on the case had indicated they would be happy to receive him back for further treatment.    Griffin Billy M.D.

## 2017-01-07 NOTE — PLAN OF CARE
Problem: Fall Risk (Adult)  Goal: Identify Related Risk Factors and Signs and Symptoms  Outcome: Ongoing (interventions implemented as appropriate)  Goal: Absence of Falls  Outcome: Ongoing (interventions implemented as appropriate)    Problem: SAFETY - NON-VIOLENT RESTRAINT  Goal: Remains free of injury from restraints (Non-Violent Restraint)  Outcome: Ongoing (interventions implemented as appropriate)  Goal: Free from restraint(s) (Non-Violent Restraint)  Outcome: Ongoing (interventions implemented as appropriate)

## 2017-01-08 NOTE — PLAN OF CARE
Problem: SAFETY - NON-VIOLENT RESTRAINT  Goal: Remains free of injury from restraints (Non-Violent Restraint)  Outcome: Ongoing (interventions implemented as appropriate)  Goal: Free from restraint(s) (Non-Violent Restraint)  Outcome: Ongoing (interventions implemented as appropriate)    Problem: Mechanical Ventilation, Invasive (Adult)  Goal: Signs and Symptoms of Listed Potential Problems Will be Absent or Manageable (Mechanical Ventilation, Invasive)  Outcome: Ongoing (interventions implemented as appropriate)    01/08/17 0650   Mechanical Ventilation, Invasive   Problems Assessed (Mechanical Ventilation, Invasive) all   Problems Present (Mechanical Ventilation, Invasive) inability to wean         Problem: Respiratory Insufficiency (Adult)  Goal: Identify Related Risk Factors and Signs and Symptoms  Outcome: Ongoing (interventions implemented as appropriate)  Goal: Effective Ventilation  Outcome: Ongoing (interventions implemented as appropriate)    Problem: Stroke (Hemorrhagic) (Adult)  Goal: Signs and Symptoms of Listed Potential Problems Will be Absent or Manageable (Stroke)  Outcome: Ongoing (interventions implemented as appropriate)

## 2017-01-08 NOTE — PROGRESS NOTES
"[facility].  Daily Progress Note.     Gabriele Szymanski  81 y.o.  male  1/8/2017    Brief problem (summary)  81-year-old male was transferred from [facility]. Unfortunately as detailed by Dr. Lewis in his initial encounter that was not proper transfer without medical records. The story is that she has hospital diagnosis of ALS and they have no neurologist wanted to transfer here for further evaluation. In addition he had a fall couple of days ago and has intracranial hemorrhage. Patient was transferred on the ventilator. Neurology and neurosurgery has been consulted.  Now the medical records are available. Apparently patient had a syncopal episode and had a fall at home and lost consciousness. Patient's wife found him on the floor of the bathroom and EMS was called was admitted to the hospital on 2 January 2017. EMG was done by Dr Griffin Billy and the impression: \"Anormal study. The study is technically suboptimal due to the nature of electrical artifacts in the intensive care unit. The study however shows predominantly low motor amplitudes with evidence of acute denervation in most muscles tested. This is consistent with motor neuron disease. The study is not typical of a demyelinative polyneuropathy in that there is no widespread severe conduction slowing or conduction block. Clinical correlation is suggested\".    Today, CT no change, patient follows commands. I will try to extubate    PMS/FH/SH: reviewed and unchanged.  Medications:     famotidine 20 mg Intravenous BID   levETIRAcetam 500 mg Intravenous Q12H   metoprolol 5 mg Intravenous Q12H       dexmedetomidine 0.2-1.5 mcg/kg/hr Last Rate: 0.5 mcg/kg/hr (01/07/17 2248)   sodium chloride 75 mL/hr Last Rate: 75 mL/hr (01/08/17 0541)       ROS:  Respiratory ROS:NA    Objective:  Temp:  [97.8 °F (36.6 °C)-102.2 °F (39 °C)] 98.8 °F (37.1 °C)  I/O last 3 completed shifts:  In: 3461.5 [I.V.:3311.5; IV Piggyback:150]  Out: 1425 [Urine:1425]   "     Physical Exam   Constitutional: Vital signs are normal. He appears well-developed. He is easily aroused. He is sedated and intubated.   HENT:   Head: Normocephalic and atraumatic. Head is without abrasion and without laceration.   Nose: No rhinorrhea or nasal deformity. No epistaxis.   Eyes: Pupils are equal, round, and reactive to light.   Neck: No JVD present. No rigidity. No tracheal deviation and no edema present. No thyroid mass and no thyromegaly present.   Cardiovascular: Regular rhythm and normal heart sounds.    Pulmonary/Chest: He is intubated. He has no decreased breath sounds. He has no wheezes.   Abdominal: Soft. Normal appearance and bowel sounds are normal. He exhibits no ascites. There is no hepatosplenomegaly.   Neurological: He is easily aroused.   Skin: No laceration, no petechiae and no rash noted. No cyanosis. Nails show no clubbing.        CT head:  No change      Results from last 7 days  Lab Units 01/08/17  0422 01/06/17  2150   WBC 10*3/mm3 8.10 7.25   HEMOGLOBIN g/dL 10.7* 10.7*   HEMATOCRIT % 32.2* 33.0*   PLATELETS 10*3/mm3 142 156       Results from last 7 days  Lab Units 01/08/17  0422 01/06/17  2150   SODIUM mmol/L 138 136   POTASSIUM mmol/L 3.2* 3.4*   CHLORIDE mmol/L 99 99   TOTAL CO2 mmol/L 23.2 26.6   BUN mg/dL 18 17   CREATININE mg/dL 0.71* 0.82   GLUCOSE mg/dL 93 108*   CALCIUM mg/dL 8.1* 8.5*           Results from last 7 days  Lab Units 01/07/17  1356   PH, ARTERIAL pH units 7.410   PO2 ART mm Hg 146.8*   PCO2, ARTERIAL mm Hg 43.2   HCO3 ART mmol/L 27.3       ASSESSMENT/ PLAN:  · Acute hypoxic respiratory failure. His chest x-ray is clear, will try again to extubate today   · History of fall with subdural hematoma. 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  [facility]    1/8/2017    EMR Dragon/Transcription disclaimer:   Much of this encounter note is an electronic transcription/translation of spoken language to printed text. The electronic translation of spoken language may permit erroneous, or at times, nonsensical words or phrases to be inadvertently transcribed; Although I have reviewed the note for such errors, some may still exist.

## 2017-01-08 NOTE — PROGRESS NOTES
Patient Identification:  NAME:  Gabriele Szymanski  Age:  81 y.o.   Sex:  male   :  1935   MRN:  [id number]       Chief complaint: Motor neuron disease    History of present illness:  This patient is stable overnight they tried to wean him this morning he was unable to wean he's been on the ventilator about 3 days he does have stable apparent post traumatic subarachnoid hemorrhage it the patient is awake alert doing well able to give bilateral thumbs up.  He had an EMG that is consistent with denervation in all 4 extremities consistent with motor neuron disease/ALS.  At this point the family once and transferred back to [place] and were okay with that.  It still not clear if he is going to be able to wean or not not and if that is the case he will likely need a trach.  For now we will see.  There is a remote history of polio that at this point I think is unrelated to this current motor neuron dysfunction    I have performed an independent eyeball review the CAT scan and there's not much change in the blood in the right hemisphere in the subarachnoid space  Past medical history:  Past Medical History   Diagnosis Date   • Asthenia 2016   • Asthma    • Cerebral hemorrhage 2017   • Closed head injury      2017   • COPD (chronic obstructive pulmonary disease)    • Depression 2016   • Disease of thyroid gland    • Hyperlipidemia 2016   • Hypertension    • Multinodular goiter 2016   • Polio    • Subdural hematoma      2017       Allergies:  Contrast dye    Home medications:  Prescriptions Prior to Admission   Medication Sig Dispense Refill Last Dose   • aspirin 81 MG chewable tablet Chew 81 mg Daily.      • atenolol (TENORMIN) 25 MG tablet Take 25 mg by mouth Daily.      • atorvastatin (LIPITOR) 20 MG tablet Take 20 mg by mouth Daily.      • budesonide-formoterol (SYMBICORT) 80-4.5 MCG/ACT inhaler Inhale 2 puffs 2 (Two) Times a Day.      • citalopram (CeleXA) 20 MG tablet Take  20 mg by mouth Daily.      • coenzyme Q10 100 MG capsule Take 100 mg by mouth Daily.      • ipratropium-albuterol (DUO-NEB) 0.5-2.5 mg/mL nebulizer Take 3 mL by nebulization 4 (Four) Times a Day As Needed for wheezing.      • midodrine (PROAMATINE) 2.5 MG tablet Take 2.5 mg by mouth 3 (Three) Times a Day.      • Multiple Vitamins-Minerals (MULTIVITAMIN ADULT PO) Take 1 tablet by mouth Daily.      • Omega-3 Fatty Acids (FISH OIL) 1000 MG capsule capsule Take 1,200 mg by mouth Daily With Breakfast.           Hospital medications:    famotidine 20 mg Intravenous BID   levETIRAcetam 500 mg Intravenous Q12H   metoprolol 5 mg Intravenous Q12H       dexmedetomidine 0.2-1.5 mcg/kg/hr Last Rate: Stopped (01/08/17 0830)   sodium chloride 75 mL/hr Last Rate: 75 mL/hr (01/08/17 0541)     FentaNYL Citrate (PF)  •  FentaNYL Citrate (PF)  •  magnesium sulfate in D5W 1g/100mL (PREMIX)  •  potassium chloride **OR** potassium chloride **OR** potassium chloride  •  potassium phosphate infusion greater than 15 mMoles **OR** potassium phosphate infusion greater than 15 mMoles **OR** potassium phosphate infusion 15 mMol or less **OR** sodium phosphate IVPB **OR** sodium phosphate IVPB **OR** sodium phosphate IVPB  •  sodium chloride      Objective:  Vitals Ranges:   Temp:  [97.8 °F (36.6 °C)-102.2 °F (39 °C)] 99.1 °F (37.3 °C)  Heart Rate:  [73-88] 81  Resp:  [14] 14  BP: (111-167)/(59-93) 139/81  FiO2 (%):  [40 %] 40 %      Physical Exam:  Awake alert still intubated can over breathes the ventilator but was unable to wean normal cranial nerves II through VII.  I'm unable to visualize his tongue as he is intubated he has fasciculations in all 4 extremities overall strength 4 out of 5 reflexes trace throughout symmetrical toes mute bilaterally    Results review:   I reviewed the patient's new clinical results.    Data review:  Lab Results (last 24 hours)     Procedure Component Value Units Date/Time    Blood Gas, Arterial [[id number]]   (Abnormal) Collected:  01/07/17 1356    Specimen:  Arterial Blood Updated:  01/07/17 1359     Site Arterial: right radial      Cristobal's Test Positive      pH, Arterial 7.410 pH units      pCO2, Arterial 43.2 mm Hg      pO2, Arterial 146.8 (H) mm Hg      HCO3, Arterial 27.3 mmol/L      Base Excess, Arterial 2.3 (H) mmol/L      O2 Saturation Calculated 99.3 (H) %      A-a Gradiant 0.6 mmHg      Barometric Pressure for Blood Gas 764.9 mmHg      Modality Adult Vent      FIO2 40 %      Ventilator Mode PS      Set Tidal Volume 430      Set Mech Resp Rate 18      PEEP 5      PSV 8 cmH2O     Narrative:       Meter: [id number] : [id number] [facility]    CBC (No Diff) [[id number]]  (Abnormal) Collected:  01/08/17 0422    Specimen:  Blood Updated:  01/08/17 0502     WBC 8.10 10*3/mm3      RBC 3.51 (L) 10*6/mm3      Hemoglobin 10.7 (L) g/dL      Hematocrit 32.2 (L) %      MCV 91.7 fL      MCH 30.5 pg      MCHC 33.2 g/dL      RDW 13.4 %      RDW-SD 44.6 fl      MPV 10.5 fL      Platelets 142 10*3/mm3     Comprehensive Metabolic Panel [[id number]]  (Abnormal) Collected:  01/08/17 0422    Specimen:  Blood Updated:  01/08/17 0527     Glucose 93 mg/dL      BUN 18 mg/dL      Creatinine 0.71 (L) mg/dL      Sodium 138 mmol/L      Potassium 3.2 (L) mmol/L      Chloride 99 mmol/L      CO2 23.2 mmol/L      Calcium 8.1 (L) mg/dL      Total Protein 5.2 (L) g/dL      Albumin 3.00 (L) g/dL      ALT (SGPT) 17 U/L      AST (SGOT) 21 U/L      Alkaline Phosphatase 53 U/L      Total Bilirubin 0.7 mg/dL      eGFR Non African Amer 107 mL/min/1.73      Globulin 2.2 gm/dL      A/G Ratio 1.4 g/dL      BUN/Creatinine Ratio 25.4 (H)      Anion Gap 15.8 mmol/L     Narrative:       The MDRD GFR formula is only valid for adults with stable renal function between ages 18 and 70.    Hemoglobin A1c [[id number]]  (Abnormal) Collected:  01/08/17 0924    Specimen:  Blood Updated:  01/08/17 0949     Hemoglobin A1C 5.89 (H) %     Narrative:       Hemoglobin  A1C Ranges:    Increased Risk for Diabetes  5.7% to 6.4%  Diabetes                     >= 6.5%  Diabetic Goal                < 7.0%    TSH [[id number]]  (Normal) Collected:  01/08/17 0924    Specimen:  Blood Updated:  01/08/17 1019     TSH 1.630 mIU/mL     Urine Culture [[id number]]  (Normal) Collected:  01/06/17 2225    Specimen:  Urine from Urine, Catheter Updated:  01/08/17 1048     Urine Culture No growth            Imaging:  Imaging Results (last 24 hours)     Procedure Component Value Units Date/Time    CT Head Without Contrast [[id number]] Collected:  01/06/17 2338     Updated:  01/07/17 2251    Narrative:       CT SCAN OF THE BRAIN WITHOUT CONTRAST.     TECHNIQUE: Multiple axial images of the brain were obtained from the  vertex to the base of the brain.     HISTORY:  Intracranial hemorrhage.     COMPARISON:  No prior studies for comparison.     FINDINGS: Examination is limited due to motion.     There is a moderately large hygroma layering the right cerebral  hemisphere measuring up to 1.2 cm in thickness. There are small in the  axial hemorrhage is involving the right temporal lobe, these may be a  consequence of contusion type injury, largest of these is seen in the  anterior pole of the right temporal lobe and measures 2.6 x 2.2 cm,  there is mild surrounding edema, there is some mass effect. Other foci  of intracranial hemorrhage are seen in the right periventricular white  matter measuring 0.7 x 0.5 cm, another focus of acute intra-axial  hemorrhage is noted adjacent to the falx to the left of midline and  measures 0.6 x 0.3 cm.     Small subdural hemorrhage layers the posterior falx to the right of  midline and measures 0.7 cm in thickness, there may be some subdural  hemorrhage layering the tentorium cerebelli especially on the right.  Small amount of intraventricular hemorrhage is noted on the left, there  may be minimal hemorrhage in the dependent portion of the right lateral  ventricle. Minimal  subarachnoid blood layering the left posterior  temporal lobe cannot be excluded.      Midline structures are within normal limits, there is no hydrocephalus.   Gray-white matter differentiation is maintained.          Mild mucosal disease of the paranasal sinuses. Orbits are unremarkable.   Mastoid air cells are well aerated.          Impression:       1.  Multiple intra-axial hemorrhages mostly in the right temporal lobe  largest measuring 2.6 cm.  2.  Subcentimeter hemorrhage 1 each in the periventricular white matter  is seen bilaterally.  3.  Intraventricular acute hemorrhage is present.  4.  Subdural hemorrhage layering the posterior falx and the tentorium.  5.  Small amount of subarachnoid hemorrhage is suspected layering the  temporal lobes bilaterally.       6.  Bilateral right greater than left hygromas layering the cerebral  hemispheres.     Case was discussed with patient's nurse Елена, 11:37 PM.     This report was finalized on 1/7/2017 10:48 PM by Dr. Juan Stokes MD.       CT Head Without Contrast [[id number]] Collected:  01/08/17 0527     Updated:  01/08/17 0527    Narrative:       CT SCAN OF THE BRAIN WITHOUT CONTRAST.     TECHNIQUE: Multiple axial images of the brain were obtained from the  vertex to the base of the brain.     HISTORY:  Subdural hemorrhage follow-up.     COMPARISON:  No prior studies for comparison.     FINDINGS:   The right temporal lobe hematomas demonstrate no significant change.  Small intraparenchymal hemorrhages one in the right periventricular  white matter, the other in the left cerebral hemisphere adjacent to the  falx both subcentimeter remain unchanged. Small amount of blood along  the posterior falx tracks onto the tentorium cerebelli, unchanged.  Intraventricular blood is also unchanged. Large hygroma layers the right  frontal lobe and measures 1.2 cm thickness. Trace amount of subarachnoid  blood layers the left posterior temporal lobe.     Midline structures are  within normal limits, there is no hydrocephalus.   Gray-white matter differentiation is maintained.          Orbits and paranasal sinuses are within normal limits.  Mastoid air  cells are well aerated.          Impression:       Multiple foci of intracranial hemorrhages, overall no significant  change.                      Assessment and Plan:     Active Problems:    Respiratory failure   likely secondary to diaphragmatic involvement of motor neuron disease/ALS.  At this point his wife wants him to be transferred back to [place] and I am certainly okay with that.  At this time he has not been able to wean but he has only been on the ventilator about 3 days.  We will see if he can be weaned versus needing a trach.  Made them aware of the possibility of a trach.  Otherwise he certainly appears stable status post traumatic subarachnoid hemorrhage!  Thanks      Dameon Ogden MD  01/08/17  1:36 PM

## 2017-01-08 NOTE — PROGRESS NOTES
LOS: 2 days   Patient Care Team:  No Known Provider as PCP - General    Chief Complaint: Status post traumatic subarachnoid hemorrhage    Subjective  the patient remains intubated and sedated.    History of Present Illness    Subjective    History taken from: chart    Objective  he seems to have fairly good movement in all 4 extremities.    Vital Signs  Temp:  [97.8 °F (36.6 °C)-102.2 °F (39 °C)] 98.8 °F (37.1 °C)  Heart Rate:  [72-88] 80  Resp:  [14] 14  BP: (111-156)/(59-92) 148/83  FiO2 (%):  [40 %] 40 %    Objective    Results Review:     I reviewed the patient's new clinical results. I reviewed all of the CT scans from [facility] as well as the scans done here.  He clearly has mostly a traumatic subarachnoid hemorrhage.  I reviewed an MRA of his brain that was done at [facility] as well that does not show any vascular anomaly.  When it is different is a fairly large subdural hygroma that has accumulated since his scans at [facility].  This is Not causing any significant midline shift but is causing some localized mass effect.    Medication Review: na    Assessment/Plan  I suggested that we continue to follow this.  The subdural hygroma may gradually go away.  There is also a question of ALS but I think this will need to be assessed further once he is over the effects of the traumatic injury.  His CT from 36 hours ago until now is stable.  Therefore I do not think we need to scan him again tomorrow.  From my point of view he can be extubated and we can start mobilizing him any time.    Active Problems:    Respiratory failure      Assessment & Plan    Fausto Lira MD  01/08/17  11:08 AM    Time: na

## 2017-01-09 PROBLEM — S06.36AA TRAUMATIC HEMORRHAGE OF CEREBRUM (HCC): Status: ACTIVE | Noted: 2017-01-01

## 2017-01-09 PROBLEM — G12.21 ALS (AMYOTROPHIC LATERAL SCLEROSIS) (HCC): Status: ACTIVE | Noted: 2017-01-01

## 2017-01-09 NOTE — PLAN OF CARE
Problem: Patient Care Overview (Adult)  Goal: Plan of Care Review  Outcome: Ongoing (interventions implemented as appropriate)    01/08/17 1362   Coping/Psychosocial Response Interventions   Plan Of Care Reviewed With patient;spouse   Patient Care Overview   Progress no change   Outcome Evaluation   Outcome Summary/Follow up Plan Pt unable to wean off of the ventilator today because of poor NIF and low volumes. Precedex off and patient is calm and alert. No complaints of pain. Patient's family requests to be transferred back to [facility] now that the testing performed by Dr. Billy is complete. Discussed with Dr. Taylor and with Dr. Ogden, both OK with transfer. Dr. Taylor will begin the process for transfer in the morning.        Goal: Discharge Needs Assessment  Outcome: Ongoing (interventions implemented as appropriate)    Problem: Fall Risk (Adult)  Goal: Identify Related Risk Factors and Signs and Symptoms  Outcome: Ongoing (interventions implemented as appropriate)  Goal: Absence of Falls  Outcome: Ongoing (interventions implemented as appropriate)    Problem: SAFETY - NON-VIOLENT RESTRAINT  Goal: Remains free of injury from restraints (Non-Violent Restraint)  Outcome: Ongoing (interventions implemented as appropriate)  Goal: Free from restraint(s) (Non-Violent Restraint)  Outcome: Ongoing (interventions implemented as appropriate)    Problem: Mechanical Ventilation, Invasive (Adult)  Goal: Signs and Symptoms of Listed Potential Problems Will be Absent or Manageable (Mechanical Ventilation, Invasive)  Outcome: Ongoing (interventions implemented as appropriate)    Problem: Respiratory Insufficiency (Adult)  Goal: Identify Related Risk Factors and Signs and Symptoms  Outcome: Ongoing (interventions implemented as appropriate)  Goal: Effective Ventilation  Outcome: Ongoing (interventions implemented as appropriate)    Problem: Stroke (Hemorrhagic) (Adult)  Goal: Signs and Symptoms of Listed  Potential Problems Will be Absent or Manageable (Stroke)  Outcome: Ongoing (interventions implemented as appropriate)

## 2017-01-09 NOTE — DISCHARGE SUMMARY
"Date of Admission: 1/6/2017  Date of Discharge:  1/9/2017    Discharge Diagnosis:  · Acute hypoxic respiratory failure. His chest x-ray is clear, Unable to extubate. Needs trach and PEG  · History of fall with subdural hematoma.    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 Lewis in his initial encounter that he  was transfered without medical records. The story is that he may a diagnosis of ALS and they wanted him transferred here for EMG. In addition he had a fall couple of days ago and has intracranial hemorrhage. Patient was transferred on the ventilator. Neurology and neurosurgery has been consulted.  Now the medical records are available. Apparently patient had a syncopal episode and had a fall at home and lost consciousness. Patient's wife found him on the floor of the bathroom and EMS was called was admitted to the hospital on 2 January 2017.      EMG was done by Dr Griffni Billy and the impression: \"Anormal study. The study is technically suboptimal due to the nature of electrical artifacts in the intensive care unit. The study however shows predominantly low motor amplitudes with evidence of acute denervation in most muscles tested. This is consistent with motor neuron disease. The study is not typical of a demyelinative polyneuropathy in that there is no widespread severe conduction slowing or conduction block. Clinical correlation is suggested\".     Dr Ogden (neurologist) note says,  This patient is stable overnight they tried to wean him this morning he was unable to wean he's been on the ventilator for few days he does have stable apparent post traumatic subarachnoid hemorrhage it the patient is awake alert doing well " able to give bilateral thumbs up. He had an EMG that is consistent with denervation in all 4 extremities consistent with motor neuron disease/ALS.  At this point the family once and transferred back to [place] and were okay with that. It still not clear if he is going to be able to wean or not not and if that is the case he will likely need a trach. For now we will see.  There is a remote history of polio that at this point I think is unrelated to this current motor neuron dysfunction     Today, CT no change, patient follows commands.    I have tried to extubate him for 2 days, his NIF is very low  Patient is awake and atert     PMS/FH/SH: reviewed and unchanged.  Medications:       famotidine  20 mg  Intravenous  BID    levETIRAcetam  500 mg  Intravenous  Q12H    metoprolol  5 mg  Intravenous  Q12H          dexmedetomidine  0.2-1.5 mcg/kg/hr  Last Rate: Stopped (01/08/17 0830)    sodium chloride  75 mL/hr  Last Rate: 75 mL/hr (01/08/17 2043)           ROS:  Respiratory ROS:NA     Objective:  Temp: [98.7 °F (37.1 °C)-100.7 °F (38.2 °C)] 100.1 °F (37.8 °C)  I/O last 3 completed shifts:  In: 2768.5 [I.V.:2518.5; IV Piggyback:250]  Out: 1500 [Urine:1500]  I/O this shift:  In: 50 [IV Piggyback:50]  Out: -      Physical Exam   Constitutional: Vital signs are normal. He appears well-developed. He is easily aroused. He is sedated and intubated.   HENT:    Head: Normocephalic and atraumatic. Head is without abrasion and without laceration.    Nose: No rhinorrhea or nasal deformity. No epistaxis.   Eyes: Pupils are equal, round, and reactive to light.   Neck: No JVD present. No rigidity. No tracheal deviation and no edema present. No thyroid mass and no thyromegaly present.   Cardiovascular: Regular rhythm and normal heart sounds.   Pulmonary/Chest: He is intubated. He has no decreased breath sounds. He has no wheezes.   Abdominal: Soft. Normal appearance and bowel sounds are normal. He exhibits no ascites. There is no  hepatosplenomegaly.   Neurological: He is alert and easily aroused.   Skin: No laceration, no petechiae and no rash noted. No cyanosis. Nails show no clubbing.          CXR             CT head:  No change       Results from last 7 days  Lab  Units  01/08/17  0422  01/06/17  2150    WBC  10*3/mm3  8.10  7.25    HEMOGLOBIN  g/dL  10.7*  10.7*    HEMATOCRIT  %  32.2*  33.0*    PLATELETS  10*3/mm3  142  156          Results from last 7 days  Lab  Units  01/09/17  0525  01/08/17  0422  01/06/17  2150    SODIUM  mmol/L  141  138  136    POTASSIUM  mmol/L  3.2*  3.2*  3.4*    CHLORIDE  mmol/L  103  99  99    TOTAL CO2  mmol/L  21.4*  23.2  26.6    BUN  mg/dL  18  18  17    CREATININE  mg/dL  0.66*  0.71*  0.82    GLUCOSE  mg/dL  75  93  108*    CALCIUM  mg/dL  8.3*  8.1*  8.5*             Results from last 7 days  Lab  Units  01/07/17  1356    PH, ARTERIAL  pH units  7.410    PO2 ART  mm Hg  146.8*    PCO2, ARTERIAL  mm Hg  43.2    HCO3 ART  mmol/L  27.3       A1C 5.89  TSH 1.63    Procedures Performed:  · EMG  EMG and Nerve Conduction Studies     Please see data sheets for details on methods, temperatures and lab standards. EMG muscles tested for upper extremity studies include the deltoid, biceps, triceps, pronator teres, extensor digitorum communis, first dorsal interosseous and abductor pollicis brevis. EMG muscles tested for lower extremity studies include the vastus lateralis, tibialis anterior, peroneus longus, medial gastrocnemius and extensor digitorum brevis. Additional muscles tested as needed. Paraspinal muscles tested as needed. The complete report includes the data sheets.     Indication: Motor neuron disease versus Guillain-Barré syndrome    History: 81-year-old male with history of apparent polio many years ago with good recovery who has had progressive weakness and more precipitous weakness resulting in respiratory failure, intubation and mechanical ventilation. The patient was transferred here from [place]  [facility] under the care of Dr. Nolan and Dr. Bryant regarding an EMG for a more definitive look at his peripheral nervous system deficits. In addition to this he has had a recent head injury with right temporal cerebral contusion, subarachnoid hemorrhage and chronic subdural        Ht: 71.5 inches  Wt: 178 pounds  HbA1C: No results found for: HGBA1C  TSH: No results found for: TSH     Technical summary:  Nerve conduction studies were obtained in the left arm and left leg. The study was done in the intensive care unit and there were excessive electrical artifacts which made the sensory nerve conductions unreadable. Skin temperatures were in the 35°C range or more measured on the palms, elbow and at the left ankle. Needle examination was obtained on selected muscles in the left arm and left leg. Significant electrical artifacts were seen which nearly obscures baseline insertional activities. There were no voluntary motor units as the patient was sedated.     Results:  1. Unreadable left median and ulnar sensory studies due to electrical artifacts in the intensive care unit.  2. Normal left median motor distal latency and conduction velocity in the forearm and above the elbow with low amplitudes of 3.3 mV from the wrist without significant evidence for motor conduction block proximally. Stimulating the palm produced an amplitude of 3.8 mV which is not a significant difference from the wrist to suggest conduction block at the wrist. Normal F wave.  3. Normal left ulnar motor distal latency with normal conduction velocities below the elbow, across the elbow and above the elbow. The amplitude was low at 4.0 mV from the wrist without evidence of conduction block proximally. Normal F wave.  4. Slow left peroneal motor velocity below the knee at 34 m/s with normal velocity in the short segment across the fibular head. Normal distal latency. Low amplitude of 0.9 mV from the ankle. No evidence of conduction block on  proximal stimulation. The F-wave was not interpretable due to excessive electrical artifacts.  5. Normal left tibial motor velocity and distal latency. The amplitude was normal at 2.8 mV from ankle. It was borderline to slightly low from the popliteal fossa at 1.8 mV but baseline was not good again due to electrode artifact. Mildly prolonged F wave at 59.5.  6. Needle examination of selected muscles in the left arm and left leg showed significant baseline electrical artifacts. There were however repetitive discharges consistent with fibrillations and positive sharp waves in all muscles tested in the arm and about half the muscles tested in the leg. There were no voluntary motor units as the patient was sedated.     Impression:  Abnormal study. The study is technically suboptimal due to the nature of electrical artifacts in the intensive care unit. The study however shows predominantly low motor amplitudes with evidence of acute denervation in most muscles tested. This is consistent with motor neuron disease. The study is not typical of a demyelinative polyneuropathy in that there is no widespread severe conduction slowing or conduction block. Clinical correlation is suggested.     Griffin Billy M.D.    Consults:  Consults     Date and Time Order Name Status Description    1/6/2017 2137 Inpatient Consult to Neurosurgery Completed     1/6/2017 2137 Inpatient Consult to Neurology Completed           Condition on Discharge:   Stable. On vent          Discharge Disposition  Another Health Care Institution Not Defined   [facility], [facility]      Discharge Medications   Gabriele Szymanski   [facility] Medication Instructions SHARON:[id number]    Printed on:01/09/17 1017   Medication Information                      famotidine (PEPCID) 10 MG/ML solution injection  Infuse 2 mL into a venous catheter 2 (Two) Times a Day.             FentaNYL Citrate, PF, (SUBLIMAZE) 100 MCG/2ML injection  Infuse 1 mL into a venous catheter Every 30  (Thirty) Minutes As Needed for severe pain (7-10) for up to 7 days.             ipratropium-albuterol (DUO-NEB) 0.5-2.5 mg/mL nebulizer  Take 3 mL by nebulization 4 (Four) Times a Day As Needed for wheezing.             levETIRAcetam in NaCl 0.82% (KEPPRA) 500 MG/100ML solution IVPB  Infuse 100 mL into a venous catheter Every 12 (Twelve) Hours.             metoprolol (LOPRESSOR) 1 MG/ML injection  Infuse 5 mL into a venous catheter Every 12 (Twelve) Hours.                 Discharge Diet:  Diet Order   Procedures   • NPO Diet     Accepting physician  Dr Nolan    Addendum  Patient has swelling of the right upper extremity where he has a PICC line.  This was noticed around noon as the patient is in the process of transfer.  He needs a Doppler to rule out DVT.  As the patient is going to be transferred to [facility] I'm asking Dr. Nolan to follow-up with Doppler of the right extremity.  However, as the patient has a head bleed we cannot anticoagulate him.      Discharge Instructions  No follow-up with Dr. Taylor or [facility] doctors, this is only a critical care admission    Dariana Taylor MD  01/09/17  10:17 AM    Time: I spent more than 45  min in the discharge planning of this patient.    EMR Dragon/Transcription disclaimer:   Much of this encounter note is an electronic transcription/translation of spoken language to printed text. The electronic translation of spoken language may permit erroneous, or at times, nonsensical words or phrases to be inadvertently transcribed; Although I have reviewed the note for such errors, some may still exist.

## 2017-01-09 NOTE — PROGRESS NOTES
Pt failed attempt to wean off ventilator this am.  Going back to [place] at family's request. Cancelled CTA that was ordered (had MRA at [place] which Dr. Lira was not aware of when the CTA was ordered, also has contrast allergy and transfer is scheduled for this am).      We will S/O. Call as needed.

## 2017-01-09 NOTE — PLAN OF CARE
Problem: Fall Risk (Adult)  Intervention: Review Medications/Identify Contributors to Fall Risk    01/09/17 0200   Safety Interventions   Medication Review/Management medications reviewed           Problem: SAFETY - NON-VIOLENT RESTRAINT  Goal: Remains free of injury from restraints (Non-Violent Restraint)  Outcome: Ongoing (interventions implemented as appropriate)  Goal: Free from restraint(s) (Non-Violent Restraint)  Outcome: Ongoing (interventions implemented as appropriate)    Problem: Stroke (Hemorrhagic) (Adult)  Goal: Signs and Symptoms of Listed Potential Problems Will be Absent or Manageable (Stroke)  Outcome: Ongoing (interventions implemented as appropriate)    01/09/17 0350   Stroke (Hemorrhagic)   Problems Present (Stroke (Hemorrhagic)) bladder/bowel dysfunction;muscle tone abnormal;eating/swallowing impairment;respiratory compromise

## 2017-01-09 NOTE — PROGRESS NOTES
"[facility].  Daily Progress Note.     Gabriele Szymanski  81 y.o.  male  1/9/2017    Brief problem (summary)  81-year-old male was transferred from [facility])  Unfortunately as detailed by Dr. Lewis in his initial encounter that he  was transfered without medical records. The story is that he may a diagnosis  of ALS and they wanted him transferred here for EMG. In addition he had a fall couple of days ago and has intracranial hemorrhage. Patient was transferred on the ventilator. Neurology and neurosurgery has been consulted.  Now the medical records are available. Apparently patient had a syncopal episode and had a fall at home and lost consciousness. Patient's wife found him on the floor of the bathroom and EMS was called was admitted to the hospital on 2 January 2017.     EMG was done by Dr Griffin Billy and the impression: \"Anormal study. The study is technically suboptimal due to the nature of electrical artifacts in the intensive care unit. The study however shows predominantly low motor amplitudes with evidence of acute denervation in most muscles tested. This is consistent with motor neuron disease. The study is not typical of a demyelinative polyneuropathy in that there is no widespread severe conduction slowing or conduction block. Clinical correlation is suggested\".    Dr Ogden (neurologist) note says,  This patient is stable overnight they tried to wean him this morning he was unable to wean he's been on the ventilator for few days he does have stable apparent post traumatic subarachnoid hemorrhage it the patient is awake alert doing well able to give bilateral thumbs up. He had an EMG that is consistent with denervation in all 4 extremities consistent with motor neuron disease/ALS.  At this point the family once and transferred back to [place] and were okay with that. It still not clear if he is going to be able to wean or not not and if that is the case he " will likely need a trach. For now we will see.  There is a remote history of polio that at this point I think is unrelated to this current motor neuron dysfunction    Today, CT no change, patient follows commands.   I have tried to extubate him for 2 days, his NIF is very low  Patient is awake and atert    PMS/FH/SH: reviewed and unchanged.  Medications:     famotidine 20 mg Intravenous BID   levETIRAcetam 500 mg Intravenous Q12H   metoprolol 5 mg Intravenous Q12H       dexmedetomidine 0.2-1.5 mcg/kg/hr Last Rate: Stopped (01/08/17 0830)   sodium chloride 75 mL/hr Last Rate: 75 mL/hr (01/08/17 2043)       ROS:  Respiratory ROS:NA    Objective:  Temp:  [98.7 °F (37.1 °C)-100.7 °F (38.2 °C)] 100.1 °F (37.8 °C)  I/O last 3 completed shifts:  In: 2768.5 [I.V.:2518.5; IV Piggyback:250]  Out: 1500 [Urine:1500]  I/O this shift:  In: 50 [IV Piggyback:50]  Out: -     Physical Exam   Constitutional: Vital signs are normal. He appears well-developed. He is easily aroused. He is sedated and intubated.   HENT:   Head: Normocephalic and atraumatic. Head is without abrasion and without laceration.   Nose: No rhinorrhea or nasal deformity. No epistaxis.   Eyes: Pupils are equal, round, and reactive to light.   Neck: No JVD present. No rigidity. No tracheal deviation and no edema present. No thyroid mass and no thyromegaly present.   Cardiovascular: Regular rhythm and normal heart sounds.    Pulmonary/Chest: He is intubated. He has no decreased breath sounds. He has no wheezes.   Abdominal: Soft. Normal appearance and bowel sounds are normal. He exhibits no ascites. There is no hepatosplenomegaly.   Neurological: He is alert and easily aroused.   Skin: No laceration, no petechiae and no rash noted. No cyanosis. Nails show no clubbing.        CXR          CT head:  No change      Results from last 7 days  Lab Units 01/08/17  0422 01/06/17  2150   WBC 10*3/mm3 8.10 7.25   HEMOGLOBIN g/dL 10.7* 10.7*   HEMATOCRIT % 32.2* 33.0*    PLATELETS 10*3/mm3 142 156       Results from last 7 days  Lab Units 01/09/17  0525 01/08/17  0422 01/06/17  2150   SODIUM mmol/L 141 138 136   POTASSIUM mmol/L 3.2* 3.2* 3.4*   CHLORIDE mmol/L 103 99 99   TOTAL CO2 mmol/L 21.4* 23.2 26.6   BUN mg/dL 18 18 17   CREATININE mg/dL 0.66* 0.71* 0.82   GLUCOSE mg/dL 75 93 108*   CALCIUM mg/dL 8.3* 8.1* 8.5*           Results from last 7 days  Lab Units 01/07/17  1356   PH, ARTERIAL pH units 7.410   PO2 ART mm Hg 146.8*   PCO2, ARTERIAL mm Hg 43.2   HCO3 ART mmol/L 27.3       ASSESSMENT/ PLAN:  · Acute hypoxic respiratory failure. His chest x-ray is clear, Unable to extubate. Needs trach and PEG  · History of fall with subdural hematoma. 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  Pulmonary, Critical Care and Sleep Medicine.  [facility]    1/9/2017    EMR Dragon/Transcription disclaimer:   Much of this encounter note is an electronic transcription/translation of spoken language to printed text. The electronic translation of spoken language may permit erroneous, or at times, nonsensical words or phrases to be inadvertently transcribed; Although I have reviewed the note for such errors, some may still exist.

## 2017-01-09 NOTE — PLAN OF CARE
Problem: Patient Care Overview (Adult)  Goal: Plan of Care Review  Outcome: Ongoing (interventions implemented as appropriate)    01/09/17 1551   Coping/Psychosocial Response Interventions   Plan Of Care Reviewed With patient;spouse;daughter   Patient Care Overview   Progress no change   Outcome Evaluation   Outcome Summary/Follow up Plan Patient transferred to [facility] to continue care per family request. Patient still requiring ventilator, for the most part remains calm and cooperative. B/P still running high, other vital signs. WDL. Denies pain. Awaiting ambulance to transfer patient to [facility].          Problem: Fall Risk (Adult)  Goal: Identify Related Risk Factors and Signs and Symptoms  Outcome: Ongoing (interventions implemented as appropriate)    01/09/17 1551   Fall Risk   Fall Risk: Related Risk Factors fear of falling;gait/mobility problems;history of falls   Fall Risk: Signs and Symptoms presence of risk factors       Goal: Absence of Falls  Outcome: Ongoing (interventions implemented as appropriate)    01/09/17 1551   Fall Risk (Adult)   Absence of Falls making progress toward outcome         Problem: SAFETY - NON-VIOLENT RESTRAINT  Goal: Remains free of injury from restraints (Non-Violent Restraint)  Outcome: Outcome(s) achieved Date Met:  01/09/17  Goal: Free from restraint(s) (Non-Violent Restraint)  Outcome: Outcome(s) achieved Date Met:  01/09/17    Problem: Mechanical Ventilation, Invasive (Adult)  Goal: Signs and Symptoms of Listed Potential Problems Will be Absent or Manageable (Mechanical Ventilation, Invasive)  Outcome: Ongoing (interventions implemented as appropriate)    01/09/17 1551   Mechanical Ventilation, Invasive   Problems Assessed (Mechanical Ventilation, Invasive) all   Problems Present (Mechanical Ventilation, Invasive) immobility;inability to wean         Problem: Respiratory Insufficiency (Adult)  Goal: Identify Related Risk Factors and Signs and Symptoms  Outcome:  Ongoing (interventions implemented as appropriate)    01/09/17 1551   Respiratory Insufficiency   Related Risk Factors (Respiratory Insufficiency) artificial airway   Signs and Symptoms (Respiratory Insufficiency) respiratory rate alterations       Goal: Effective Ventilation  Outcome: Ongoing (interventions implemented as appropriate)    01/09/17 1551   Respiratory Insufficiency (Adult)   Effective Ventilation unable to achieve outcome         Problem: Stroke (Hemorrhagic) (Adult)  Goal: Signs and Symptoms of Listed Potential Problems Will be Absent or Manageable (Stroke)  Outcome: Ongoing (interventions implemented as appropriate)    01/09/17 1551   Stroke (Hemorrhagic)   Problems Assessed (Stroke (Hemorrhagic)) all   Problems Present (Stroke (Hemorrhagic)) muscle tone abnormal;situational response

## 2017-01-10 NOTE — PROGRESS NOTES
"Continued Stay [facility]     Patient Name: Gabriele Szymanski  MRN: [id number]  Today's Date: 1/10/2017    Admit Date: 1/6/2017          Discharge Plan       01/10/17 1731    Case Management/Social Work Plan    Plan Transfer to [facility]    Patient/Family In Agreement With Plan yes    Additional Comments Late entry for 1/9/16:  Received notice from RN that pt is to be transferred back to [facility] today.  DC summary and orders completed by Dr. Taylor.  Per RN pt will transfer back due to family preference and that he was only transferred to [facility] for testing.  Reviewed notes and discussed with pt and wife at bedside.  Pt's wife states that they want to go back because pt's \"doctors are there\".  Discussed that [facility] could not say if Medicare will pay for the transport back due to transfer not being for increased or more specialized care.  Wife states that she does not care, she wants pt moved and she will \"write the check\" for the ambulance.  Discussed with Irene SOTO and with liaison for [facility].  [facility] will require a check for $690 prior to transport.  Wife aware and in agreement.  Copy of chart obtained from medical records, disk of films obtained from the film library.  RN has called report.  Transport arranged for 16:00.  Discussed with Dr. Taylor.    Final Note    Final Note Pt transferred via ALS ambulance back to [facility].              Discharge Codes       01/10/17 1731    Discharge Codes    Discharge Codes 02  Discharged/transferred to short-term acute care hospital        Expected Discharge Date and Time     Expected Discharge Date Expected Discharge Time    Jan 9, 2017  6:20 PM            Hortensia Harrison RN    "